# Patient Record
Sex: FEMALE | Race: WHITE | NOT HISPANIC OR LATINO | Employment: UNEMPLOYED | URBAN - METROPOLITAN AREA
[De-identification: names, ages, dates, MRNs, and addresses within clinical notes are randomized per-mention and may not be internally consistent; named-entity substitution may affect disease eponyms.]

---

## 2017-02-10 ENCOUNTER — CLINICAL SUPPORT (OUTPATIENT)
Dept: REHABILITATION | Facility: HOSPITAL | Age: 57
End: 2017-02-10
Attending: INTERNAL MEDICINE
Payer: COMMERCIAL

## 2017-02-10 DIAGNOSIS — K59.04 FUNCTIONAL CONSTIPATION: ICD-10-CM

## 2017-02-10 PROCEDURE — 97161 PT EVAL LOW COMPLEX 20 MIN: CPT | Mod: PO

## 2017-02-10 PROCEDURE — 97110 THERAPEUTIC EXERCISES: CPT | Mod: PO

## 2017-02-10 NOTE — PROGRESS NOTES
Patient: Chelsie COSTELLO Purnima   [facility] #:  [id number]    Date of treatment: 02/10/2017   Time in: 10:55  Time out: 12:00    Chelsie is a 56 y.o. female evaluated on 2/10/2017    Physician:  Steve Verdin MD   Diagnosis:   Encounter Diagnosis   Name Primary?    Functional constipation         Treatment ordered: PT    Medical History:   Past Medical History   Diagnosis Date    Disc disorder      of cervical and lumbar regions    Fibromyalgia     IBS (irritable bowel syndrome)     Plantar fasciitis      bilat        Surgical History:   Past Surgical History   Procedure Laterality Date    Nose surgery      Tummy tuck      Bone resection, rib       r/t rib fracture    Tonsillectomy          Medications:   Current Outpatient Prescriptions   Medication Sig    amitriptyline (ELAVIL) 25 MG tablet Take 25 mg by mouth nightly as needed.     conj estrogens-bazedoxifene (DUAVEE) 0.45-20 mg Tab Take 1 tablet by mouth once daily.    estradiol (ESTRACE) 0.01 % (0.1 mg/gram) vaginal cream Place 0.5 g vaginally every other day.    GRALISE 300 mg Tb24 Take 900 mg by mouth every evening.     LINZESS 290 mcg Cap Take 290 mcg by mouth once daily.     metformin (GLUCOPHAGE-XR) 500 MG 24 hr tablet     naproxen (NAPROSYN) 500 MG tablet     tizanidine (ZANAFLEX) 4 MG tablet Take 4 mg by mouth 3 (three) times daily as needed.     TRINTELLIX 10 mg Tab      No current facility-administered medications for this visit.        Allergies: Review of patient's allergies indicates:  No Known Allergies   Precautions: universal  OB:GYN History:childbirth vaginal delivery x2 and episiotomy      Bladder/Bowel History: trouble initiating urine stream, dribbling after urination, urinary incontinence and constipation/straining for movement       SUBJECTIVE:   History of current complaint: pt reports that she reports that she takes Linzess and 2 laxatives daily.  (senna based).  With this regimen, she has a daily movement in the am.  Stool  consistency is soft, almost watery.  Vacations throw her off.  She is able to pass stool fairly well- she reports that she waits until the urge to defecate is pretty intense.  She has had this regimen for the past couple of years.      Date of Onset: at least 2 years ago  Symptoms are: unchanged    Frequency of Urination: Daytime: every 2-5 hours           Nighttime: 0-1    Urine Stream: strong    Frequency of Bowel Movements: once a day    Types of Fluid Intake: water, diet Dr Pepper (4 per day), coffee (2 mugs)      Bladder Leakage: Yes  Frequency of incidents: few times per week  Amount Leaked: teaspoons    Colon Leakage: No  Frequency of incidents: (only happened couple of times with excessive laxative use)  Amount Leaked: streaking/staining      Current Exercise: yoga     Pain:  Patient reports 0/10 with 0 being the lowest and 10 being the highest        OBJECTIVE:  Patient received 50 minutes of treatment which consisted of evaluation and 10 minutes of ther ex.    ORTHO SCREEN  Posture: WNL      Pelvic Alignment: no deviations noted in supine       ABDOMINALS  Scarring:  Tummy tuck scar mobile and non-painful      Diastasis: none   Abdominal strength: rectus 4/5     Transverse : required multiple cues to activate in an isolated fashion.  Could also distend her belly fairly well despite tummy tuck       RECTAL PELVIC FLOOR EXAM:    Excursion:WNL   Discharge: none    Skin Integrity:  Intact perianal skin          External Clock:   Scarring:   none  Sensation: WNL    Pain: none  Anal Bodega: intact     Internal Assessment:   Impaction:none    Pain: none  EAS tone: WNL  Sensation: WNL    Muscle Power: 2 /5         Quality: decreased hold and incomplete relaxation       Specificity:patient contracts: gluts but could correct with cues      Coordination:tends to hold breath during PFM contraction    SEMG Evaluation: deferred due to time constraints    Treatment Given: instructed on general anatomy/physiology of  urinary/bowel system; discussed plan of care with patient; instructed in benefits/risks of treatment; instructed in alternative methods of treatment; instructed in risks of refusing treatment; patient agreed to treatment plan; instructed in diaphragmatic breathing per handout issued.  Pt verbalized understanding of all instruction.      ASSESSMENT:  History  Co-morbidities and personal factors that may impact the plan of care Examination  Body Structures and Functions, activity limitations and participation restrictions that may impact the plan of care Clinical Presentation   Decision Making/ Complexity Score   Co-morbidities:   History of tummy tuck (abdominoplasty), IBS    Personal Factors:   Increased consumption of bladder irritants Body Regions:Weak, poorly relaxing pelvic floor muscles, weak TA    Body Systems:           Activity limitations:       Participation Restrictions:   Pt's ability to travel is limited by        Stable and uncomplicated 25% limitation per [facility] Bowel Survey in [facility]           Problem List:    poor knowledge of body mechanics and posture, poor trunk stability, decreased pelvic muscle strength, decreased phasic ability of the pelvid cmuscles, increased tension of the pelvic muscles, poor quality of pelvic muscle contraction, poor coordination of pelvic floor muscles during ADL's leading to urinary or fecal leakage and dysfunctional defecation    Barriers to Learning: none    Educational/Spiritual/Cultural needs:  None  Environmental Barriers: none noted  Abuse/Neglect: no signs  Nutritional Status: WDWN WF  Fall Risk: none    FUNCTIONAL GOALS: 3 months  1. Patient able to perform basic ADL with less leaking.,   2. Cough, sneeze, or laugh with less incontinence.,   3. Able to maintain normal breathing pattern during pelvic floor muscle contraction.,   4. Pt. to be I with techniques for double voiding.,   5. Pt. to report more effective stooling.,   6. Pt to be I with self mgmt. of  symptoms.    7. Pt to be I with HEP.    PATIENTS GOALS: to be able to defecate more easily without so much medication, and to avoid urinary leakage.      PLAN:  theraputic exercises, neuromuscular re-ed and manual therapy as indicated    Physical Therapy Education: anatomy/physiology of pelvic floor, posture/body mechanices, dilator use, diaphragmatic breathing and double voiding techniques    Frequency/Duration: once per 2 weeks for 3 months

## 2017-02-10 NOTE — PATIENT INSTRUCTIONS
Home Exercise Program: 02/10/2017      DIAPHRAGMATIC BREATHING     The diaphragm is a dome shaped muscle that forms the floor of the rib cage. It is the most efficient muscle for breathing and relaxation, although most people are not used to using the diaphragm. Diaphragmatic or belly breathing is an important technique to learn because it helps settle down or relax the autonomic nervous system. The correct use of diaphragmatic breathing can help to quiet brain activity resulting in the relaxation of all the muscles and organs of the body. This is accomplished by slow rhythmic breathing concentrated in the diaphragm muscle rather than the chest.    How to do proper relaxation breathing:     Start by lying on your back or reclining in a chair in a relaxed position. Place one hand on your chest and the other on your abdomen.   Relax your jaw by placing your tongue on the roof of your mouth and keeping your teeth slightly apart.    Take a deep breath in through your nose, letting the abdomen expand and rise while you keep your upper chest, neck and shoulders relaxed.    As you breathe out through your mouth, allow your abdomen and chest to fall. Exhale completely.   Remember to breathe slowly.  Do not force your breathing. Do not hold your breath.   Repeat for 5 minutes, 1-2 times per day   Also: sit tall on toilet with knees apart to avoid vaginal voiding.

## 2017-03-03 ENCOUNTER — CLINICAL SUPPORT (OUTPATIENT)
Dept: REHABILITATION | Facility: HOSPITAL | Age: 57
End: 2017-03-03
Attending: INTERNAL MEDICINE
Payer: COMMERCIAL

## 2017-03-03 DIAGNOSIS — K59.04 FUNCTIONAL CONSTIPATION: ICD-10-CM

## 2017-03-03 PROCEDURE — 97112 NEUROMUSCULAR REEDUCATION: CPT | Mod: PO

## 2017-03-03 NOTE — PATIENT INSTRUCTIONS
"Home Exercise Program: 03/03/2017      DIAPHRAGMATIC BREATHING     The diaphragm is a dome shaped muscle that forms the floor of the rib cage. It is the most efficient muscle for breathing and relaxation, although most people are not used to using the diaphragm. Diaphragmatic or belly breathing is an important technique to learn because it helps settle down or relax the autonomic nervous system. The correct use of diaphragmatic breathing can help to quiet brain activity resulting in the relaxation of all the muscles and organs of the body. This is accomplished by slow rhythmic breathing concentrated in the diaphragm muscle rather than the chest.    How to do proper relaxation breathing:     Start by lying on your back or reclining in a chair in a relaxed position. Place one hand on your chest and the other on your abdomen.   Take a deep breath in through your nose, letting the abdomen expand and rise while you keep your upper chest, neck and shoulders relaxed. DO NOT PUSH YOUR BELLY OUT.  "LET THE AIR DO THE WORK."   As you breathe out through your mouth, allow your abdomen and chest to fall. Exhale completely.  DO NOT PULL YOUR BELLY IN.   Remember to breathe slowly.  Do not force your breathing. Do not hold your breath.   Repeat for 5 minutes, 1-2 times per day   Also: sit tall on toilet with knees apart to avoid vaginal voiding.            Remember: when pushing to poop, your belly should move out and waist should widen.    "

## 2017-03-03 NOTE — PROGRESS NOTES
"Patient: Chelsie Felton   Clinic #: [id number]   Diagnosis:   Encounter Diagnosis   Name Primary?    Functional constipation       Date of Start of care: 2/10/2017  Date of treatment: 03/03/2017   Time in: 12:30  Time out: 1:15  Total Treatment time: 40 minutes    Subjective: pt reports last BM was this am.  She reports that she took Linzess this morning upon waking and she had 2-3 BM's in succession.  Watery stools.  She is asking about how to decrease her medications to bulk her stool.  I directed these to her physician.      Chelsie reported 0/10 pain today.    Objective:      Treatment:     Chelsie Felton  received neuromuscular re-education for improved coordination x 40 minutes: Kegels with assist of SEMG; bearing down with pelvic floor drop with SEMG assist.   We worked in supine and sidelying with external lead wires.  Pt demonstrated elevated baseline resting, fair/good WR rise, and fair holding in 10 sec Kegels.  Derecruitment was incomplete and delayed.  (She dropped fully only once).  In bearing down, she demonstrated slightly increased PFM activity.  We then reviewed proper technique for bearing down, and diaphragmatic breathing.  She was noted in DB to push her belly out forcefully, and to draw it in actively.  With cues to "let the air to the work", she was able to correct this.  She was issued a handout with new instructions, including having her widen her waist when trying to defecate.  Pt verbalized understanding of all instruction.      Assessment: Pt performed well with the right verbal cues- hopefully this will carry over between sessions.       Will the patient continue to benefit from skilled PT intervention? Yes  Medical Necessity is demonstrated by:  Requires skilled supervision to complete and progress HEP  Progress towards goals: fair    New/Revised Goals:  none    Plan:  Continue with established Plan of Care toward PT goals.          "

## 2017-03-29 ENCOUNTER — CLINICAL SUPPORT (OUTPATIENT)
Dept: REHABILITATION | Facility: HOSPITAL | Age: 57
End: 2017-03-29
Attending: INTERNAL MEDICINE
Payer: COMMERCIAL

## 2017-03-29 DIAGNOSIS — K59.04 FUNCTIONAL CONSTIPATION: ICD-10-CM

## 2017-03-29 PROCEDURE — 97140 MANUAL THERAPY 1/> REGIONS: CPT | Mod: PO

## 2017-03-29 PROCEDURE — 97110 THERAPEUTIC EXERCISES: CPT | Mod: PO

## 2017-03-29 NOTE — PATIENT INSTRUCTIONS
Home Program: 3/29/2017    1. Try to get 3 square meals per day.  Don't eat so much that you are uncomfortable, but don't graze either.      2. After each meal, lie down or sit on toilet and do colon massage, at least 3 passes.  This should happen at least 5-10 minutes after finishing your meal.    3. Then sit on toilet and do diaphragmatic breathing.  Should aim for a small need to push- not a big strain or NO push.      4. Don't sit and breathe for more than 5 minutes.

## 2017-03-29 NOTE — PROGRESS NOTES
"Patient: Chelsie Felton   Clinic #: [id number]   Diagnosis:   Encounter Diagnosis   Name Primary?    Functional constipation       Date of Start of care: 2/10/2017  Date of treatment: 03/29/2017   Time in: 11:07  Time out: 12:05  Total Treatment time:  55 minutes    Subjective: pt reports last BM was this am.  She has tried to eliminate the laxatives ([facility] laxative and stool softener) and just take Linzess in the am but she starts backing up if she doesn't do both.  Watery stools.  She reports abdominal discomfort and bloating today, despite having a BM this morning.  She has been compliant with the HEP issued last session.        Chelsie reported 0/10 pain today.    Objective:      Treatment:     Chelsie Felton  received neuromuscular re-education for improved coordination x 25 minutes: Kegels with assist of SEMG; DB for pelvic floor muscle derecruitment.   We worked in supine with external lead wires.  Pt demonstrated elevated baseline resting which normalized after a few minutes, fair WR rise, and fair holding in 10 sec Kegels.  Derecruitment was delayed but became more and more effective with reps.  (She dropped fully 4 times out of 10).  She was noted to use DB much more effectively this session.  We spoke about her toileting schedule, and including sitting for 5 minutes with DB.    She was issued a handout with new instructions.    Pt verbalized understanding of all instruction.      Pt then rec'd 30 minutes of manual therapy including instruction in colon massage, and cecal mobilization.  We discussed her habit of "grazing" instead of eating 3 full meals, and I suggested that she eat 3 meals with minimal snacking in between to facilitate bowel regularity.  Please refer to pt. Instructions for revised home program.     Assessment: Pt with improved PFM release- will try to look at bearing down in sitting position, possibly on toilet next session.      Will the patient continue to benefit from skilled PT " intervention? Yes  Medical Necessity is demonstrated by:  Requires skilled supervision to complete and progress HEP  Progress towards goals: fair    New/Revised Goals:  none    Plan:  Continue with established Plan of Care toward PT goals.

## 2017-04-26 ENCOUNTER — CLINICAL SUPPORT (OUTPATIENT)
Dept: REHABILITATION | Facility: HOSPITAL | Age: 57
End: 2017-04-26
Attending: INTERNAL MEDICINE
Payer: COMMERCIAL

## 2017-04-26 DIAGNOSIS — K59.04 FUNCTIONAL CONSTIPATION: ICD-10-CM

## 2017-04-26 PROCEDURE — 97112 NEUROMUSCULAR REEDUCATION: CPT | Mod: PO

## 2017-04-26 NOTE — PATIENT INSTRUCTIONS
Home Program: 4/26/2017    Continue sitting for 5 minutes after meals, doing colon massage.      When ready to push, remember the sequence:  1. Breathe in letting your belly go.    2. Breathe out slowly  3. After starting your exhale, bear down and push.  Push until you have finished exhaling.

## 2017-04-26 NOTE — PROGRESS NOTES
Patient: Chelsie Felton   Clinic #: [id number]   Diagnosis:   Encounter Diagnosis   Name Primary?    Functional constipation       Date of Start of care: 2/10/2017  Date of treatment: 04/26/2017   Time in: 3:02  Time out: 4:00  Total Treatment time:  55 minutes    Subjective: pt reports last BM was this am.  Has tried Linzess at night but doesn't work so is back to taking 2 laxatives at night and Linzess during the day.    She reports no change in her abdominal discomfort.   She has been compliant with the HEP issued last session.        Chelsie reported 0/10 pain today.    Objective:      Treatment:     Chelsie Felton  received neuromuscular re-education for improved coordination x 55 minutes: Kegels with assist of SEMG; DB for pelvic floor muscle derecruitment, with practice bearing down in sitting on at and on toilet.   We worked in sitting with external lead wires.  Pt demonstrated elevated baseline resting which normalized after a few minutes, good WR rise, and fair holding in 10 sec Kegels.  Derecruitment was delayed but became more and more effective with reps.   We then moved to the restroom and worked on pelvic floor drop in bearing down with SEMG assist.  She was noted to drop the pelvic floor well if she pushed after starting her exhale first.      She was issued a handout with new instructions as to how to practice bearing down at home.    Pt verbalized understanding of all instruction.      Assessment: Pt performed well- actually passed a little gas, which I told her was a step in the right direction.       Will the patient continue to benefit from skilled PT intervention? Yes  Medical Necessity is demonstrated by:  Requires skilled supervision to complete and progress HEP  Progress towards goals: fair    New/Revised Goals:  none    Plan:  Continue with established Plan of Care toward PT goals.

## 2017-07-05 ENCOUNTER — CLINICAL SUPPORT (OUTPATIENT)
Dept: REHABILITATION | Facility: HOSPITAL | Age: 57
End: 2017-07-05
Attending: INTERNAL MEDICINE
Payer: COMMERCIAL

## 2017-07-05 DIAGNOSIS — K59.04 FUNCTIONAL CONSTIPATION: ICD-10-CM

## 2017-07-05 PROCEDURE — 97112 NEUROMUSCULAR REEDUCATION: CPT | Mod: PO

## 2017-07-05 NOTE — PROGRESS NOTES
"Patient: Chelsie Felton   Clinic #: [id number]   Diagnosis:   Encounter Diagnosis   Name Primary?    Functional constipation       Date of Start of care: 2/10/2017  Date of treatment: 07/05/2017   Time in: 3:05  Time out: 3:45  Total Treatment time: 40 minutes    Subjective: pt reports last BM was this am.  She has been able to reduce her laxative intake "a good bit" but is still taking daily Linzess.  Finds the colon massage helpful for her abdominal discomfort.      Chelsie reported 0/10 pain today.    Objective:      Treatment:     Chelsie Felton  received neuromuscular re-education for improved coordination x 40 minutes: Kegels with assist of SEMG; DB for pelvic floor muscle derecruitment, with practice bearing down in sitting on at and on toilet.   We worked in SL and sitting with external lead wires.  Pt demonstrated elevated baseline resting which normalized after a few minutes, good WR rise, and fair holding in 10 sec Kegels.   We then moved to the restroom and worked on pelvic floor drop in bearing down with SEMG assist.  She was noted to drop the pelvic floor well if she pushed after starting her exhale first.      We reviewed proper posture and pelvic positioning for more effective evacuation.  She was instructed to continue to utilize the push and blow technique for pelvic floor drop, and to follow up with her doctor as needed.      Pt verbalized understanding of all instruction.      Assessment: pt performed well and is less dependent on laxative medication for constipation.  I with HEP.  No further need for PT services noted.       Goals:  1. Patient able to perform basic ADL with less leaking.,   2. Cough, sneeze, or laugh with less incontinence.,   3. Able to maintain normal breathing pattern during pelvic floor muscle contraction.,   4. Pt. to be I with techniques for double voiding.,   5. Pt. to report more effective stooling.,   6. Pt to be I with self mgmt. of symptoms.    7. Pt to be I with " HEP  ALL MET    Plan:  D/C PT

## 2018-06-07 PROBLEM — M25.50 MULTIPLE JOINT PAIN: Status: ACTIVE | Noted: 2018-06-07

## 2018-06-07 PROBLEM — M79.10 MYALGIA: Status: ACTIVE | Noted: 2018-06-07

## 2018-06-07 PROBLEM — M25.552 BILATERAL HIP PAIN: Status: ACTIVE | Noted: 2018-06-07

## 2018-06-07 PROBLEM — M62.838 MUSCLE SPASM: Status: ACTIVE | Noted: 2018-06-07

## 2018-06-07 PROBLEM — M79.10 MUSCLE PAIN: Status: ACTIVE | Noted: 2018-06-07

## 2018-06-07 PROBLEM — R53.82 CHRONIC FATIGUE: Status: ACTIVE | Noted: 2018-06-07

## 2018-06-07 PROBLEM — M25.551 BILATERAL HIP PAIN: Status: ACTIVE | Noted: 2018-06-07

## 2018-06-07 PROBLEM — M54.2 CERVICAL PAIN: Status: ACTIVE | Noted: 2018-06-07

## 2018-07-19 PROBLEM — R74.8 ELEVATED CPK: Status: ACTIVE | Noted: 2018-07-19

## 2018-09-20 PROBLEM — M54.2 CERVICALGIA: Status: ACTIVE | Noted: 2018-09-20

## 2018-09-20 PROBLEM — M60.9 MYOSITIS OF MULTIPLE SITES: Status: ACTIVE | Noted: 2018-09-20

## 2018-10-05 PROBLEM — J20.9 ACUTE BRONCHITIS: Status: ACTIVE | Noted: 2018-10-05

## 2018-10-05 PROBLEM — R05.9 COUGH: Status: ACTIVE | Noted: 2018-10-05

## 2022-03-02 LAB — PAP RECOMMENDATION EXT: NORMAL

## 2022-03-24 LAB — CRC RECOMMENDATION EXT: NORMAL

## 2022-04-10 ENCOUNTER — HOSPITAL ENCOUNTER (EMERGENCY)
Facility: HOSPITAL | Age: 62
Discharge: HOME | End: 2022-04-10
Attending: EMERGENCY MEDICINE
Payer: COMMERCIAL

## 2022-04-10 VITALS
BODY MASS INDEX: 25.61 KG/M2 | TEMPERATURE: 98 F | WEIGHT: 150 LBS | OXYGEN SATURATION: 99 % | SYSTOLIC BLOOD PRESSURE: 161 MMHG | DIASTOLIC BLOOD PRESSURE: 100 MMHG | HEART RATE: 84 BPM | HEIGHT: 64 IN | RESPIRATION RATE: 18 BRPM

## 2022-04-10 DIAGNOSIS — S05.12XA CONTUSION OF LEFT ORBIT, INITIAL ENCOUNTER: ICD-10-CM

## 2022-04-10 DIAGNOSIS — H11.32 SUBCONJUNCTIVAL HEMORRHAGE, LEFT: Primary | ICD-10-CM

## 2022-04-10 PROCEDURE — 25000003 PHARM REV CODE 250: Performed by: PHYSICIAN ASSISTANT

## 2022-04-10 PROCEDURE — 99283 EMERGENCY DEPT VISIT LOW MDM: CPT

## 2022-04-10 RX ORDER — TETRACAINE HYDROCHLORIDE 5 MG/ML
2 SOLUTION OPHTHALMIC
Status: COMPLETED | OUTPATIENT
Start: 2022-04-10 | End: 2022-04-10

## 2022-04-10 RX ADMIN — FLUORESCEIN SODIUM 1 EACH: 1 STRIP OPHTHALMIC at 02:04

## 2022-04-10 RX ADMIN — TETRACAINE HYDROCHLORIDE 2 DROP: 5 SOLUTION OPHTHALMIC at 02:04

## 2022-04-10 NOTE — ED PROVIDER NOTES
Encounter Date: 4/10/2022    SCRIBE #1 NOTE: I, Jose J Hsieh, am scribing for, and in the presence of,  Stefan Lin PA-C. I have scribed the following portions of the note - Other sections scribed: HPI, ROS.       History     Chief Complaint   Patient presents with    Eye Pain     PT to ER with c/o left eye pain s/p something falling on eye while in store. Pt denies blurred vision.      61 y.o. female, with no pertinent past medical history presents to the ED with constant mild left eye pain that began 1 hour prior to arrival. Pt states that she was shopping at [employer] when an approximately 20 pound garden pole fell onto her face and hit her left eye. Pt reports associated eye redness and left sided facial pain. Pt states that most of the facial pain is over her left eye brow. Pt denies being on blood thinners. No other exacerbating or alleviating factors. Patient denies loss of consciousness, nausea, vomiting, photophobia, or other associated symptoms.       The history is provided by the patient. No  was used.     Review of patient's allergies indicates:  No Known Allergies  Past Medical History:   Diagnosis Date    Disc disorder     of cervical and lumbar regions    Fibromyalgia     IBS (irritable bowel syndrome)     Plantar fasciitis     bilat     Past Surgical History:   Procedure Laterality Date    BONE RESECTION, RIB      r/t rib fracture    NOSE SURGERY      TONSILLECTOMY      tummy tuck       Family History   Problem Relation Age of Onset    Breast cancer Neg Hx     Colon cancer Neg Hx     Ovarian cancer Neg Hx      Social History     Tobacco Use    Smoking status: Never Smoker    Smokeless tobacco: Never Used   Substance Use Topics    Alcohol use: No     Review of Systems   Constitutional: Negative for chills and fever.   HENT: Negative for congestion, rhinorrhea and sore throat.         (+) left sided facial pain   Eyes: Positive for pain and redness. Negative  for photophobia and visual disturbance.   Respiratory: Negative for cough and shortness of breath.    Cardiovascular: Negative for chest pain.   Gastrointestinal: Negative for abdominal pain, diarrhea, nausea and vomiting.   Genitourinary: Negative for dysuria, frequency and hematuria.   Musculoskeletal: Negative for back pain.   Skin: Negative for rash.   Neurological: Negative for dizziness, syncope, weakness and headaches.       Physical Exam     Initial Vitals [04/10/22 1300]   BP Pulse Resp Temp SpO2   (!) 161/100 84 18 98 °F (36.7 °C) 99 %      MAP       --         Physical Exam    Nursing note and vitals reviewed.  Constitutional: She appears well-developed and well-nourished. She is not diaphoretic. No distress.   HENT:   Head: Normocephalic and atraumatic.   Nose: Nose normal.   Eyes:   Subconjunctival hemorrhage without hyphema to the left eye.  No photophobia.  No fluorescein uptake.  Minor tenderness without bony deformity or bruising to the left mid eyebrow.  Full ROM of extraocular muscles without pain or difficulty.  No hemotympanum or septal hematoma.  No infra orbit tenderness.  Intra-ocular pressure equals 16.    Neck: No tracheal deviation present. No JVD present.   Normal range of motion.  Cardiovascular: Normal rate, regular rhythm and normal heart sounds. Exam reveals no friction rub.    No murmur heard.  Pulmonary/Chest: Breath sounds normal. No stridor. No respiratory distress. She has no wheezes. She has no rhonchi. She has no rales. She exhibits no tenderness.   Musculoskeletal:         General: No tenderness or edema. Normal range of motion.      Cervical back: Normal range of motion.     Neurological: She is alert and oriented to person, place, and time. She has normal strength. She displays no tremor. She displays no seizure activity. Coordination and gait normal.   Skin: Skin is warm and dry. No rash and no abscess noted. No erythema. No pallor.         ED Course   Procedures  Labs  Reviewed - No data to display       Imaging Results    None          Medications   fluorescein ophthalmic strip 1 each (1 each Left Eye Given 4/10/22 1401)   TETRAcaine HCl (PF) 0.5 % Drop 2 drop (2 drops Left Eye Given 4/10/22 1400)     Medical Decision Making:   History:   Old Medical Records: I decided to obtain old medical records.  ED Management:  Subconjunctival hemorrhage on the left and contusion of the left supra-orbit.  No evidence of open globe or convincing evidence for orbit fracture.  No signs of entrapment.  Normal intra-ocular pressure.  No vision loss.  Low suspicion for intracranial hemorrhage.  Patient reassured.  Advising follow-up with PCP. Strict return precautions discussed.  Agreeable to plan.          Scribe Attestation:   Scribe #1: I performed the above scribed service and the documentation accurately describes the services I performed. I attest to the accuracy of the note.                 Clinical Impression:   Final diagnoses:  [H11.32] Subconjunctival hemorrhage, left (Primary)  [S05.12XA] Contusion of left orbit, initial encounter          ED Disposition Condition    Discharge Stable        ED Prescriptions     None        Follow-up Information     Follow up With Specialties Details Why Contact Info    Hugo Hale MD Internal Medicine Schedule an appointment as soon as possible for a visit in 1 day For re-evaluation [address] [facility] [id number] [phone]      [facility] - Emergency Dept Emergency Medicine Go to  If symptoms worsen [address]  [facility] [address] [phone]        I, Stefan Lin PA-C, personally performed the services described in this documentation. All medical record entries made by the scribe were at my direction and in my presence. I have reviewed the chart and agree that the record reflects my personal performance and is accurate and complete.       Stefan Lin PA-C  04/10/22 2316

## 2022-04-10 NOTE — DISCHARGE INSTRUCTIONS

## 2022-05-25 ENCOUNTER — OFFICE VISIT (OUTPATIENT)
Dept: OBSTETRICS AND GYNECOLOGY | Facility: CLINIC | Age: 62
End: 2022-05-25
Payer: COMMERCIAL

## 2022-05-25 VITALS
SYSTOLIC BLOOD PRESSURE: 112 MMHG | DIASTOLIC BLOOD PRESSURE: 82 MMHG | WEIGHT: 162.25 LBS | BODY MASS INDEX: 27.85 KG/M2

## 2022-05-25 DIAGNOSIS — N63.11 BREAST LUMP ON RIGHT SIDE AT 10 O'CLOCK POSITION: Primary | ICD-10-CM

## 2022-05-25 DIAGNOSIS — Z12.31 BREAST CANCER SCREENING BY MAMMOGRAM: ICD-10-CM

## 2022-05-25 PROCEDURE — 99999 PR PBB SHADOW E&M-EST. PATIENT-LVL III: CPT | Mod: PBBFAC,,, | Performed by: OBSTETRICS & GYNECOLOGY

## 2022-05-25 PROCEDURE — 3079F DIAST BP 80-89 MM HG: CPT | Mod: CPTII,S$GLB,, | Performed by: OBSTETRICS & GYNECOLOGY

## 2022-05-25 PROCEDURE — 1159F PR MEDICATION LIST DOCUMENTED IN MEDICAL RECORD: ICD-10-PCS | Mod: CPTII,S$GLB,, | Performed by: OBSTETRICS & GYNECOLOGY

## 2022-05-25 PROCEDURE — 3008F BODY MASS INDEX DOCD: CPT | Mod: CPTII,S$GLB,, | Performed by: OBSTETRICS & GYNECOLOGY

## 2022-05-25 PROCEDURE — 99203 OFFICE O/P NEW LOW 30 MIN: CPT | Mod: S$GLB,,, | Performed by: OBSTETRICS & GYNECOLOGY

## 2022-05-25 PROCEDURE — 3074F SYST BP LT 130 MM HG: CPT | Mod: CPTII,S$GLB,, | Performed by: OBSTETRICS & GYNECOLOGY

## 2022-05-25 PROCEDURE — 3008F PR BODY MASS INDEX (BMI) DOCUMENTED: ICD-10-PCS | Mod: CPTII,S$GLB,, | Performed by: OBSTETRICS & GYNECOLOGY

## 2022-05-25 PROCEDURE — 3074F PR MOST RECENT SYSTOLIC BLOOD PRESSURE < 130 MM HG: ICD-10-PCS | Mod: CPTII,S$GLB,, | Performed by: OBSTETRICS & GYNECOLOGY

## 2022-05-25 PROCEDURE — 99999 PR PBB SHADOW E&M-EST. PATIENT-LVL III: ICD-10-PCS | Mod: PBBFAC,,, | Performed by: OBSTETRICS & GYNECOLOGY

## 2022-05-25 PROCEDURE — 1159F MED LIST DOCD IN RCRD: CPT | Mod: CPTII,S$GLB,, | Performed by: OBSTETRICS & GYNECOLOGY

## 2022-05-25 PROCEDURE — 3079F PR MOST RECENT DIASTOLIC BLOOD PRESSURE 80-89 MM HG: ICD-10-PCS | Mod: CPTII,S$GLB,, | Performed by: OBSTETRICS & GYNECOLOGY

## 2022-05-25 PROCEDURE — 99203 PR OFFICE/OUTPT VISIT, NEW, LEVL III, 30-44 MIN: ICD-10-PCS | Mod: S$GLB,,, | Performed by: OBSTETRICS & GYNECOLOGY

## 2022-05-25 RX ORDER — DULOXETIN HYDROCHLORIDE 30 MG/1
30 CAPSULE, DELAYED RELEASE ORAL 2 TIMES DAILY
COMMUNITY
Start: 2021-12-09 | End: 2022-05-31 | Stop reason: SDUPTHER

## 2022-05-25 RX ORDER — MELOXICAM 15 MG/1
15 TABLET ORAL DAILY PRN
COMMUNITY
Start: 2022-04-04 | End: 2022-05-31 | Stop reason: SDUPTHER

## 2022-05-25 NOTE — PROGRESS NOTES
SUBJECTIVE:   61 y.o. female   for breast lump    Patient's last menstrual period was 2012..      Pt has had screening MMG in 3/2022 and NL . This was done at [facility]  She reported feeling a small lump on her Rt breast x 2 days  Not painful  Only feeling it depends on the position  Denies skin changes or nipple discharge  Pt with h/o breast biopsy years ago - does not remember where  Denies personal h/o or FH of breast cancer    She is menopause and has been on HRT x years, she is working to wean off of HRT      OB History    Para Term  AB Living   2 2           SAB IAB Ectopic Multiple Live Births                  # Outcome Date GA Lbr Adrian/2nd Weight Sex Delivery Anes PTL Lv   2 Para            1 Para               Obstetric Comments   Denies abnl pap, pap neg 3/2022     Past Medical History:   Diagnosis Date    Disc disorder     of cervical and lumbar regions    Fibromyalgia     IBS (irritable bowel syndrome)     Plantar fasciitis     bilat     Past Surgical History:   Procedure Laterality Date    BONE RESECTION, RIB      r/t rib fracture    NOSE SURGERY      TONSILLECTOMY      tummy tuck       Social History     Socioeconomic History    Marital status:    Tobacco Use    Smoking status: Never Smoker    Smokeless tobacco: Never Used   Substance and Sexual Activity    Alcohol use: No     Family History   Problem Relation Age of Onset    Breast cancer Neg Hx     Colon cancer Neg Hx     Ovarian cancer Neg Hx          Current Outpatient Medications   Medication Sig Dispense Refill    DULoxetine (CYMBALTA) 30 MG capsule Take 30 mg by mouth 2 (two) times daily.      conj estrogens-bazedoxifene (DUAVEE) 0.45-20 mg Tab Take 1 tablet by mouth once daily. (Patient not taking: Reported on 2022) 30 tablet 12    GRALISE 300 mg Tb24 Take 900 mg by mouth every evening.       LINZESS 290 mcg Cap Take 290 mcg by mouth once daily.       meloxicam (MOBIC) 15 MG tablet Take 15 mg  by mouth daily as needed.      tizanidine (ZANAFLEX) 4 MG tablet Take 4 mg by mouth 3 (three) times daily as needed.       TRINTELLIX 10 mg Tab        No current facility-administered medications for this visit.     Allergies: Patient has no known allergies.       ROS:  GENERAL: Denies weight gain or weight loss. Feeling well overall.   SKIN: Denies rash or lesions.   HEAD: Denies head injury or headache.   NODES: Denies enlarged lymph nodes.   CHEST: Denies chest pain or shortness of breath.   CARDIOVASCULAR: Denies palpitations or left sided chest pain.   ABDOMEN: No abdominal pain, constipation, diarrhea, nausea, vomiting or rectal bleeding.   URINARY: No frequency, dysuria, hematuria, or burning on urination.  REPRODUCTIVE: Denies vaginal discharge, abnormal vaginal bleeding, lesions, pelvic pain  BREASTS: The patient performs breast self-examination and denies pain, lumps, or nipple discharge.   HEMATOLOGIC: No easy bruisability or excessive bleeding.  MUSCULOSKELETAL: Denies joint pain or swelling.   NEUROLOGIC: Denies syncope or weakness.   PSYCHIATRIC: Denies depression, anxiety or mood swings.      OBJECTIVE:   /82   Wt 73.6 kg (162 lb 4.1 oz)   LMP 01/12/2012   BMI 27.85 kg/m²   The patient appears well, alert, oriented x 3, in no distress.  NECK: negative, no thyromegaly, trachea midline  SKIN: normal, good color, good turgor and no acne, striae, hirsutism  BREAST EXAM: left breast normal without mass, skin or nipple changes or axillary nodes, small tiny lump noted on 10 oclock of right breast. Not tender  ABDOMEN: soft, non-tender; bowel sounds normal; no masses,  no organomegaly and no hernias, masses, or hepatosplenomegaly  GYN: deferred      ASSESSMENT:   .Chelsie was seen today for lump.    Diagnoses and all orders for this visit:    Breast lump on right side at 10 o'clock position  -     US Breast Right Limited; Future        -     Continue self breast exam    Continue weaning of off HRT -  discussed non-HRT if symptoms worsen           normal...

## 2022-05-30 ENCOUNTER — OFFICE VISIT (OUTPATIENT)
Dept: FAMILY MEDICINE | Facility: CLINIC | Age: 62
End: 2022-05-30
Payer: COMMERCIAL

## 2022-05-30 VITALS
SYSTOLIC BLOOD PRESSURE: 122 MMHG | TEMPERATURE: 98 F | DIASTOLIC BLOOD PRESSURE: 78 MMHG | HEART RATE: 69 BPM | BODY MASS INDEX: 27.75 KG/M2 | HEIGHT: 64 IN | WEIGHT: 162.56 LBS | OXYGEN SATURATION: 96 %

## 2022-05-30 DIAGNOSIS — F51.01 PRIMARY INSOMNIA: ICD-10-CM

## 2022-05-30 DIAGNOSIS — M79.7 FIBROMYALGIA: ICD-10-CM

## 2022-05-30 DIAGNOSIS — Z11.59 ENCOUNTER FOR HEPATITIS C SCREENING TEST FOR LOW RISK PATIENT: ICD-10-CM

## 2022-05-30 DIAGNOSIS — Z23 NEED FOR TD VACCINE: ICD-10-CM

## 2022-05-30 DIAGNOSIS — F41.8 DEPRESSION WITH ANXIETY: ICD-10-CM

## 2022-05-30 DIAGNOSIS — Z23 NEED FOR SHINGLES VACCINE: Primary | ICD-10-CM

## 2022-05-30 DIAGNOSIS — E78.1 HYPERTRIGLYCERIDEMIA, ESSENTIAL: ICD-10-CM

## 2022-05-30 PROCEDURE — 3008F PR BODY MASS INDEX (BMI) DOCUMENTED: ICD-10-PCS | Mod: CPTII,S$GLB,, | Performed by: STUDENT IN AN ORGANIZED HEALTH CARE EDUCATION/TRAINING PROGRAM

## 2022-05-30 PROCEDURE — 3078F PR MOST RECENT DIASTOLIC BLOOD PRESSURE < 80 MM HG: ICD-10-PCS | Mod: CPTII,S$GLB,, | Performed by: STUDENT IN AN ORGANIZED HEALTH CARE EDUCATION/TRAINING PROGRAM

## 2022-05-30 PROCEDURE — 3074F PR MOST RECENT SYSTOLIC BLOOD PRESSURE < 130 MM HG: ICD-10-PCS | Mod: CPTII,S$GLB,, | Performed by: STUDENT IN AN ORGANIZED HEALTH CARE EDUCATION/TRAINING PROGRAM

## 2022-05-30 PROCEDURE — 99203 OFFICE O/P NEW LOW 30 MIN: CPT | Mod: 25,S$GLB,, | Performed by: STUDENT IN AN ORGANIZED HEALTH CARE EDUCATION/TRAINING PROGRAM

## 2022-05-30 PROCEDURE — 3074F SYST BP LT 130 MM HG: CPT | Mod: CPTII,S$GLB,, | Performed by: STUDENT IN AN ORGANIZED HEALTH CARE EDUCATION/TRAINING PROGRAM

## 2022-05-30 PROCEDURE — 1159F MED LIST DOCD IN RCRD: CPT | Mod: CPTII,S$GLB,, | Performed by: STUDENT IN AN ORGANIZED HEALTH CARE EDUCATION/TRAINING PROGRAM

## 2022-05-30 PROCEDURE — 99999 PR PBB SHADOW E&M-EST. PATIENT-LVL III: CPT | Mod: PBBFAC,,, | Performed by: STUDENT IN AN ORGANIZED HEALTH CARE EDUCATION/TRAINING PROGRAM

## 2022-05-30 PROCEDURE — 1159F PR MEDICATION LIST DOCUMENTED IN MEDICAL RECORD: ICD-10-PCS | Mod: CPTII,S$GLB,, | Performed by: STUDENT IN AN ORGANIZED HEALTH CARE EDUCATION/TRAINING PROGRAM

## 2022-05-30 PROCEDURE — 3008F BODY MASS INDEX DOCD: CPT | Mod: CPTII,S$GLB,, | Performed by: STUDENT IN AN ORGANIZED HEALTH CARE EDUCATION/TRAINING PROGRAM

## 2022-05-30 PROCEDURE — 90471 IMMUNIZATION ADMIN: CPT | Mod: S$GLB,,, | Performed by: STUDENT IN AN ORGANIZED HEALTH CARE EDUCATION/TRAINING PROGRAM

## 2022-05-30 PROCEDURE — 99999 PR PBB SHADOW E&M-EST. PATIENT-LVL III: ICD-10-PCS | Mod: PBBFAC,,, | Performed by: STUDENT IN AN ORGANIZED HEALTH CARE EDUCATION/TRAINING PROGRAM

## 2022-05-30 PROCEDURE — 90714 TD VACC NO PRESV 7 YRS+ IM: CPT | Mod: S$GLB,,, | Performed by: STUDENT IN AN ORGANIZED HEALTH CARE EDUCATION/TRAINING PROGRAM

## 2022-05-30 PROCEDURE — 90714 TD VACCINE GREATER THAN OR EQUAL TO 7YO PRESERVATIVE FREE IM: ICD-10-PCS | Mod: S$GLB,,, | Performed by: STUDENT IN AN ORGANIZED HEALTH CARE EDUCATION/TRAINING PROGRAM

## 2022-05-30 PROCEDURE — 90750 HZV VACC RECOMBINANT IM: CPT | Mod: S$GLB,,, | Performed by: STUDENT IN AN ORGANIZED HEALTH CARE EDUCATION/TRAINING PROGRAM

## 2022-05-30 PROCEDURE — 99203 PR OFFICE/OUTPT VISIT, NEW, LEVL III, 30-44 MIN: ICD-10-PCS | Mod: 25,S$GLB,, | Performed by: STUDENT IN AN ORGANIZED HEALTH CARE EDUCATION/TRAINING PROGRAM

## 2022-05-30 PROCEDURE — 90472 IMMUNIZATION ADMIN EACH ADD: CPT | Mod: S$GLB,,, | Performed by: STUDENT IN AN ORGANIZED HEALTH CARE EDUCATION/TRAINING PROGRAM

## 2022-05-30 PROCEDURE — 90471 TD VACCINE GREATER THAN OR EQUAL TO 7YO PRESERVATIVE FREE IM: ICD-10-PCS | Mod: S$GLB,,, | Performed by: STUDENT IN AN ORGANIZED HEALTH CARE EDUCATION/TRAINING PROGRAM

## 2022-05-30 PROCEDURE — 90472 ZOSTER RECOMBINANT VACCINE: ICD-10-PCS | Mod: S$GLB,,, | Performed by: STUDENT IN AN ORGANIZED HEALTH CARE EDUCATION/TRAINING PROGRAM

## 2022-05-30 PROCEDURE — 3078F DIAST BP <80 MM HG: CPT | Mod: CPTII,S$GLB,, | Performed by: STUDENT IN AN ORGANIZED HEALTH CARE EDUCATION/TRAINING PROGRAM

## 2022-05-30 PROCEDURE — 90750 ZOSTER RECOMBINANT VACCINE: ICD-10-PCS | Mod: S$GLB,,, | Performed by: STUDENT IN AN ORGANIZED HEALTH CARE EDUCATION/TRAINING PROGRAM

## 2022-05-30 RX ORDER — ALPRAZOLAM 0.5 MG/1
0.5 TABLET ORAL 3 TIMES DAILY
COMMUNITY
End: 2022-05-31 | Stop reason: SDUPTHER

## 2022-05-30 RX ORDER — SOD SULF/POT CHLORIDE/MAG SULF 1.479 G
TABLET ORAL
COMMUNITY
Start: 2022-03-15 | End: 2022-05-30 | Stop reason: ALTCHOICE

## 2022-05-30 RX ORDER — METHYLPREDNISOLONE 4 MG/1
TABLET ORAL
COMMUNITY
Start: 2021-10-13 | End: 2022-05-30 | Stop reason: ALTCHOICE

## 2022-05-30 RX ORDER — ESTERIFIED ESTROGEN AND METHYLTESTOSTERONE .625; 1.25 MG/1; MG/1
1 TABLET ORAL
COMMUNITY
Start: 2022-03-02 | End: 2022-05-30

## 2022-05-30 RX ORDER — MEDROXYPROGESTERONE ACETATE 2.5 MG/1
2.5 TABLET ORAL DAILY
COMMUNITY
Start: 2022-03-02 | End: 2022-05-30

## 2022-05-30 RX ORDER — DULOXETIN HYDROCHLORIDE 30 MG/1
1 CAPSULE, DELAYED RELEASE ORAL 2 TIMES DAILY
COMMUNITY
Start: 2021-12-09 | End: 2022-05-30 | Stop reason: SDUPTHER

## 2022-05-30 RX ORDER — ESTRADIOL AND NORETHINDRONE ACETATE 1; .5 MG/1; MG/1
1 TABLET ORAL DAILY
COMMUNITY
Start: 2021-12-11 | End: 2022-05-30

## 2022-05-30 RX ORDER — MEDROXYPROGESTERONE ACETATE 2.5 MG/1
2.5 TABLET ORAL
COMMUNITY
Start: 2022-03-02 | End: 2022-05-30

## 2022-05-30 RX ORDER — AZELASTINE 1 MG/ML
1 SPRAY, METERED NASAL
COMMUNITY
End: 2022-06-08 | Stop reason: SDUPTHER

## 2022-05-30 RX ORDER — TRAZODONE HYDROCHLORIDE 50 MG/1
50 TABLET ORAL NIGHTLY
Qty: 30 TABLET | Refills: 2 | Status: SHIPPED | OUTPATIENT
Start: 2022-05-30 | End: 2023-04-24

## 2022-05-30 RX ORDER — MELOXICAM 15 MG/1
1 TABLET ORAL DAILY PRN
COMMUNITY
Start: 2021-11-11 | End: 2022-05-30 | Stop reason: SDUPTHER

## 2022-05-30 RX ORDER — ESTERIFIED ESTROGEN AND METHYLTESTOSTERONE .625; 1.25 MG/1; MG/1
1 TABLET ORAL DAILY
COMMUNITY
Start: 2022-03-02 | End: 2022-05-30

## 2022-05-30 NOTE — PATIENT INSTRUCTIONS
Thank you for enrolling in [facility]. Please follow the instructions below to securely access your online medical record. My allows you to send messages to your doctor, view your test results, renew your prescriptions, schedule appointments, and more.     How Do I Sign Up?  In your Internet browser, go to [email or web address].  In the lower right of the page, click the Activate Now link located under the Have Access Code? Title.  Enter your [facility] Access Code exactly as it appears below. You will not need to use this code after youve completed the sign-up process. If you do not sign up before the expiration date, you must request a new code.  [facility] Access Code: [email or web address]  Expires: 7/9/2022  2:04 PM    Enter Date of Birth (mm/dd/yyyy) as indicated and click the Next button. You will be taken to the next sign-up page.  Create a [facility] ID. This will be your new [facility] login ID and cannot be changed, so think of one that is secure and easy to remember.  Create a [facility] password.  Your password must be at least 8 characters long and contain at least 1 letter and 1 number.  You can change your password at any time.  Enter your Password Reset Question and Answer, then click the Next button.   Enter your e-mail address. You will receive e-mail notification when new information is available in [facility].  Click Sign Up. You can now view your medical record.     Additional Information  If you have questions, you can email [email or web address] or call [phone]  to talk to our [facility] staff. Remember, [facility] is NOT to be used for urgent needs. For medical emergencies, dial 911.

## 2022-05-30 NOTE — PROGRESS NOTES
05/30/2022    Chelsie Felton  [id number]    Chief Complaint   Patient presents with    Establish Care       HPI    This patient I snew to me and presents to establish care. Chronic medical conditions listed below.    Gets cramps in toes and ankles feel stiff  Denies any color changes     AWILDA/depression  xanax PRN  But recently taking at night due to life stressor;  with meta cancer  Cymbalta 30 mg BID    Fibromyalgia  Cymbalta 30 mg BID     Insomnia  Dog wakes her at 2 am usually to go outside   Unable to go back to sleep  Sometimes will try benadryl   Goes to sleep without trouble but cant stay asleep    High triglycerides  Seen on lipid panel   Is not on cholesterol medication    Negative 10 point ROS outside of HPI      Past Medical History:   Diagnosis Date    Disc disorder     of cervical and lumbar regions    Fibromyalgia     IBS (irritable bowel syndrome)     Plantar fasciitis     bilat       Past Surgical History:   Procedure Laterality Date    BONE RESECTION, RIB      r/t rib fracture    NOSE SURGERY      TONSILLECTOMY      tummy tuck         Family History   Problem Relation Age of Onset    Breast cancer Neg Hx     Colon cancer Neg Hx     Ovarian cancer Neg Hx        Social History     Socioeconomic History    Marital status:    Tobacco Use    Smoking status: Never Smoker    Smokeless tobacco: Never Used   Substance and Sexual Activity    Alcohol use: No         Current Outpatient Medications:     ALPRAZolam (XANAX) 0.5 MG tablet, Take 0.5 mg by mouth 3 (three) times daily., Disp: , Rfl:     azelastine (ASTELIN) 137 mcg (0.1 %) nasal spray, 1 spray by Nasal route., Disp: , Rfl:     DULoxetine (CYMBALTA) 30 MG capsule, Take 1 capsule by mouth 2 (two) times daily., Disp: , Rfl:     meloxicam (MOBIC) 15 MG tablet, Take 1 tablet by mouth daily as needed., Disp: , Rfl:     conj estrogens-bazedoxifene (DUAVEE) 0.45-20 mg Tab, Take 1 tablet by mouth once daily. (Patient not  taking: No sig reported), Disp: 30 tablet, Rfl: 12    DULoxetine (CYMBALTA) 30 MG capsule, Take 30 mg by mouth 2 (two) times daily., Disp: , Rfl:     estradiol-norethindrone (ACTIVELLA) 1-0.5 mg per tablet, Take 1 tablet by mouth once daily., Disp: , Rfl:     estrogens,esterified,-methyltestosterone 0.625-1.25mg (ESTRATEST HS) per tablet, Take 1 tablet by mouth., Disp: , Rfl:     estrogens,esterified,-methyltestosterone 0.625-1.25mg (ESTRATEST HS) per tablet, Take 1 tablet by mouth once daily., Disp: , Rfl:     GRALISE 300 mg Tb24, Take 900 mg by mouth every evening. , Disp: , Rfl:     LINZESS 290 mcg Cap, Take 290 mcg by mouth once daily. , Disp: , Rfl:     medroxyPROGESTERone (PROVERA) 2.5 MG tablet, Take 2.5 mg by mouth., Disp: , Rfl:     medroxyPROGESTERone (PROVERA) 2.5 MG tablet, Take 2.5 mg by mouth once daily., Disp: , Rfl:     meloxicam (MOBIC) 15 MG tablet, Take 15 mg by mouth daily as needed., Disp: , Rfl:     methylPREDNISolone (MEDROL DOSEPACK) 4 mg tablet, follow package directions, Disp: , Rfl:     SUTAB 1.479-0.188- 0.225 gram tablet, use as directed, Disp: , Rfl:     tizanidine (ZANAFLEX) 4 MG tablet, Take 4 mg by mouth 3 (three) times daily as needed. , Disp: , Rfl:     TRINTELLIX 10 mg Tab, , Disp: , Rfl:         Vitals:    05/30/22 1003   BP: 122/78   Pulse: 69   Temp: 98.1 °F (36.7 °C)       PHYSICAL EXAM    GEN: NAD, AAox3, well nourished  HEENT: NCAT, EOMI, PEERL, no scleral injection, TM normal, moist mucous membranes, oropharynx clear, no erythema, no exudates  NECK: full rom, no cervical lymphadenopathy, no thyroidmegally  CV: RRR, no m/r/g, trace LE edema  LUNGS: CTAB, non-labored breathing, no wheezes, no crackles  ABD: soft, non-distended, no rebound/guarding, no organomegaly  EXT: n c/c/e, warm, 5/5 UE and LE strength  NEURO: CNII-XII intact no focal deficit  PSYCH: nl affect, no hallucinations, nl speech  Skin: intact, no rashes/lesions/erythema    1. Primary insomnia  -  START traZODone (DESYREL) 50 MG tablet; Take 1 tablet (50 mg total) by mouth every evening.  Dispense: 30 tablet; Refill: 2  - Discussed sleep hygiene    2. Need for Td vaccine  - (In Office Administered) Td Vaccine - Preservative Free    3. Need for shingles vaccine  - (In Office Administered) Zoster Recombinant Vaccine    4. Hypertriglyceridemia, essential  - Lipid Panel; Future; in 5 months    5. Encounter for hepatitis C screening test for low risk patient  - Hepatitis C Antibody; Future    6. Fibromyalgia  -continue current regimen    7. Depression with anxiety  Continue current regimen    RTC in 6 months    Chloe Monroy MD  Family Medicine

## 2022-06-01 ENCOUNTER — HOSPITAL ENCOUNTER (OUTPATIENT)
Dept: RADIOLOGY | Facility: HOSPITAL | Age: 62
Discharge: HOME | End: 2022-06-01
Attending: OBSTETRICS & GYNECOLOGY
Payer: COMMERCIAL

## 2022-06-01 VITALS — BODY MASS INDEX: 27.77 KG/M2 | WEIGHT: 162.69 LBS | HEIGHT: 64 IN

## 2022-06-01 DIAGNOSIS — N63.11 BREAST LUMP ON RIGHT SIDE AT 10 O'CLOCK POSITION: ICD-10-CM

## 2022-06-01 PROCEDURE — 76642 ULTRASOUND BREAST LIMITED: CPT | Mod: TC,RT

## 2022-06-01 PROCEDURE — 77061 MAMMO DIGITAL DIAGNOSTIC RIGHT WITH TOMO: ICD-10-PCS | Mod: 26,RT,, | Performed by: RADIOLOGY

## 2022-06-01 PROCEDURE — 77065 DX MAMMO INCL CAD UNI: CPT | Mod: TC,RT

## 2022-06-01 PROCEDURE — 76642 US BREAST RIGHT LIMITED: ICD-10-PCS | Mod: 26,RT,, | Performed by: RADIOLOGY

## 2022-06-01 PROCEDURE — 77065 MAMMO DIGITAL DIAGNOSTIC RIGHT WITH TOMO: ICD-10-PCS | Mod: 26,RT,, | Performed by: RADIOLOGY

## 2022-06-01 PROCEDURE — 76642 ULTRASOUND BREAST LIMITED: CPT | Mod: 26,RT,, | Performed by: RADIOLOGY

## 2022-06-01 PROCEDURE — 77061 BREAST TOMOSYNTHESIS UNI: CPT | Mod: 26,RT,, | Performed by: RADIOLOGY

## 2022-06-01 PROCEDURE — 77065 DX MAMMO INCL CAD UNI: CPT | Mod: 26,RT,, | Performed by: RADIOLOGY

## 2022-06-01 RX ORDER — DULOXETIN HYDROCHLORIDE 30 MG/1
30 CAPSULE, DELAYED RELEASE ORAL 2 TIMES DAILY
Qty: 60 CAPSULE | Refills: 11 | Status: SHIPPED | OUTPATIENT
Start: 2022-06-01 | End: 2023-04-24

## 2022-06-01 RX ORDER — MELOXICAM 15 MG/1
15 TABLET ORAL DAILY PRN
Qty: 30 TABLET | Refills: 3 | Status: SHIPPED | OUTPATIENT
Start: 2022-06-01 | End: 2023-04-24

## 2022-06-01 RX ORDER — ALPRAZOLAM 0.5 MG/1
0.5 TABLET ORAL NIGHTLY PRN
Qty: 30 TABLET | Refills: 0 | Status: SHIPPED | OUTPATIENT
Start: 2022-06-01 | End: 2022-06-02 | Stop reason: SDUPTHER

## 2022-06-02 NOTE — TELEPHONE ENCOUNTER
----- Message from Jennie Ascencio sent at 6/2/2022 10:56 AM CDT -----  Regarding: Requesting a call back  Contact: RAJ PARKINSON [[id number]]  Name of Who is Calling:RAJ PARKINSON [[id number]]           What is the request in detail:   Pt states she is requesting her mediation be sent to [id number] - [place], [place] - [address],   1)ALPRAZolam (XANAX) 0.5 MG tablet   2)DULoxetine (CYMBALTA) 30 MG capsule   3)meloxicam (MOBIC) 15 MG tablet   Please advise   Can the clinic reply by [facility]: No           [facility] to [facility]:[phone]

## 2022-06-03 ENCOUNTER — TELEPHONE (OUTPATIENT)
Dept: FAMILY MEDICINE | Facility: CLINIC | Age: 62
End: 2022-06-03
Payer: COMMERCIAL

## 2022-06-03 RX ORDER — ALPRAZOLAM 0.5 MG/1
0.5 TABLET ORAL NIGHTLY PRN
Qty: 30 TABLET | Refills: 0 | Status: SHIPPED | OUTPATIENT
Start: 2022-06-03 | End: 2023-04-24

## 2022-06-03 RX ORDER — MELOXICAM 15 MG/1
15 TABLET ORAL DAILY PRN
Qty: 30 TABLET | Refills: 3 | OUTPATIENT
Start: 2022-06-03

## 2022-06-03 RX ORDER — DULOXETIN HYDROCHLORIDE 30 MG/1
30 CAPSULE, DELAYED RELEASE ORAL 2 TIMES DAILY
Qty: 60 CAPSULE | Refills: 11 | OUTPATIENT
Start: 2022-06-03

## 2022-06-03 NOTE — TELEPHONE ENCOUNTER
Patients states the DULoxetine (CYMBALTA) is for her anxiety depression and that she cant miss it. Patient states she is running low and needs her medication. Patient is asking do you want her to take a different kind of medication? Patient states she only take ALPRAZolam (XANAX) at night to help her sleep sometimes.  Please advise

## 2022-06-03 NOTE — TELEPHONE ENCOUNTER
----- Message from Jackie Pryor sent at 6/3/2022  8:45 AM CDT -----  Regarding: call back in regards to medication  Type: Patient Call Back    Who called: Chelsie     What is the request in detail: the patient is returning a call back to the staff in regards to medication. Please return call at earliest convenience.    Can the clinic reply by [facility]?no     Would the patient rather a call back or a response via [facility]? Call back     Best call back number:[phone]

## 2022-06-03 NOTE — TELEPHONE ENCOUNTER
Spoke with patient and informed her that her message for refill on medications cymbalta, meloxicam, and xanax has been sent to the provider. We are waiting on a response. Patient verbalized understanding.

## 2022-06-08 RX ORDER — AZELASTINE 1 MG/ML
1 SPRAY, METERED NASAL 2 TIMES DAILY
Qty: 30 ML | Refills: 11 | Status: SHIPPED | OUTPATIENT
Start: 2022-06-08 | End: 2023-04-24

## 2022-08-29 ENCOUNTER — TELEPHONE (OUTPATIENT)
Dept: FAMILY MEDICINE | Facility: CLINIC | Age: 62
End: 2022-08-29
Payer: COMMERCIAL

## 2022-08-29 DIAGNOSIS — Z23 NEED FOR SHINGLES VACCINE: Primary | ICD-10-CM

## 2022-08-30 ENCOUNTER — CLINICAL SUPPORT (OUTPATIENT)
Dept: FAMILY MEDICINE | Facility: CLINIC | Age: 62
End: 2022-08-30
Payer: COMMERCIAL

## 2022-08-30 ENCOUNTER — TELEPHONE (OUTPATIENT)
Dept: FAMILY MEDICINE | Facility: CLINIC | Age: 62
End: 2022-08-30

## 2022-08-30 ENCOUNTER — LAB VISIT (OUTPATIENT)
Dept: LAB | Facility: HOSPITAL | Age: 62
End: 2022-08-30
Attending: STUDENT IN AN ORGANIZED HEALTH CARE EDUCATION/TRAINING PROGRAM
Payer: COMMERCIAL

## 2022-08-30 DIAGNOSIS — Z11.59 ENCOUNTER FOR HEPATITIS C SCREENING TEST FOR LOW RISK PATIENT: ICD-10-CM

## 2022-08-30 DIAGNOSIS — E78.1 HYPERTRIGLYCERIDEMIA, ESSENTIAL: ICD-10-CM

## 2022-08-30 DIAGNOSIS — Z23 NEED FOR SHINGLES VACCINE: Primary | ICD-10-CM

## 2022-08-30 LAB
CHOLEST SERPL-MCNC: 203 MG/DL (ref 120–199)
CHOLEST/HDLC SERPL: 3.4 {RATIO} (ref 2–5)
HCV AB SERPL QL IA: NORMAL
HDLC SERPL-MCNC: 59 MG/DL (ref 40–75)
HDLC SERPL: 29.1 % (ref 20–50)
LDLC SERPL CALC-MCNC: 123.2 MG/DL (ref 63–159)
NONHDLC SERPL-MCNC: 144 MG/DL
TRIGL SERPL-MCNC: 104 MG/DL (ref 30–150)

## 2022-08-30 PROCEDURE — 90750 HZV VACC RECOMBINANT IM: CPT | Mod: S$GLB,,, | Performed by: STUDENT IN AN ORGANIZED HEALTH CARE EDUCATION/TRAINING PROGRAM

## 2022-08-30 PROCEDURE — 86803 HEPATITIS C AB TEST: CPT | Performed by: STUDENT IN AN ORGANIZED HEALTH CARE EDUCATION/TRAINING PROGRAM

## 2022-08-30 PROCEDURE — 36415 COLL VENOUS BLD VENIPUNCTURE: CPT | Mod: PO | Performed by: STUDENT IN AN ORGANIZED HEALTH CARE EDUCATION/TRAINING PROGRAM

## 2022-08-30 PROCEDURE — 80061 LIPID PANEL: CPT | Performed by: STUDENT IN AN ORGANIZED HEALTH CARE EDUCATION/TRAINING PROGRAM

## 2022-08-30 PROCEDURE — 90750 ZOSTER RECOMBINANT VACCINE: ICD-10-PCS | Mod: S$GLB,,, | Performed by: STUDENT IN AN ORGANIZED HEALTH CARE EDUCATION/TRAINING PROGRAM

## 2022-08-30 NOTE — TELEPHONE ENCOUNTER
----- Message from Chloe Monroy MD sent at 8/30/2022  3:02 PM CDT -----  Please let pt know that her cholesterol is good and can repeated as needed.

## 2023-02-07 ENCOUNTER — PATIENT MESSAGE (OUTPATIENT)
Dept: ADMINISTRATIVE | Facility: HOSPITAL | Age: 63
End: 2023-02-07
Payer: COMMERCIAL

## 2023-04-24 ENCOUNTER — PATIENT OUTREACH (OUTPATIENT)
Dept: ADMINISTRATIVE | Facility: HOSPITAL | Age: 63
End: 2023-04-24
Payer: COMMERCIAL

## 2023-04-24 ENCOUNTER — OFFICE VISIT (OUTPATIENT)
Dept: FAMILY MEDICINE | Facility: CLINIC | Age: 63
End: 2023-04-24
Payer: COMMERCIAL

## 2023-04-24 VITALS
BODY MASS INDEX: 26.31 KG/M2 | WEIGHT: 154.13 LBS | HEIGHT: 64 IN | HEART RATE: 62 BPM | SYSTOLIC BLOOD PRESSURE: 118 MMHG | DIASTOLIC BLOOD PRESSURE: 74 MMHG | TEMPERATURE: 98 F | OXYGEN SATURATION: 98 %

## 2023-04-24 DIAGNOSIS — Z00.00 ANNUAL PHYSICAL EXAM: Primary | ICD-10-CM

## 2023-04-24 DIAGNOSIS — Z12.31 VISIT FOR SCREENING MAMMOGRAM: ICD-10-CM

## 2023-04-24 DIAGNOSIS — F33.0 MILD EPISODE OF RECURRENT MAJOR DEPRESSIVE DISORDER: ICD-10-CM

## 2023-04-24 PROCEDURE — 1159F MED LIST DOCD IN RCRD: CPT | Mod: CPTII,S$GLB,, | Performed by: FAMILY MEDICINE

## 2023-04-24 PROCEDURE — 3074F PR MOST RECENT SYSTOLIC BLOOD PRESSURE < 130 MM HG: ICD-10-PCS | Mod: CPTII,S$GLB,, | Performed by: FAMILY MEDICINE

## 2023-04-24 PROCEDURE — 3074F SYST BP LT 130 MM HG: CPT | Mod: CPTII,S$GLB,, | Performed by: FAMILY MEDICINE

## 2023-04-24 PROCEDURE — 99999 PR PBB SHADOW E&M-EST. PATIENT-LVL IV: ICD-10-PCS | Mod: PBBFAC,,, | Performed by: FAMILY MEDICINE

## 2023-04-24 PROCEDURE — 99999 PR PBB SHADOW E&M-EST. PATIENT-LVL IV: CPT | Mod: PBBFAC,,, | Performed by: FAMILY MEDICINE

## 2023-04-24 PROCEDURE — 99213 PR OFFICE/OUTPT VISIT, EST, LEVL III, 20-29 MIN: ICD-10-PCS | Mod: 25,S$GLB,, | Performed by: FAMILY MEDICINE

## 2023-04-24 PROCEDURE — 99396 PREV VISIT EST AGE 40-64: CPT | Mod: S$GLB,,, | Performed by: FAMILY MEDICINE

## 2023-04-24 PROCEDURE — 99396 PR PREVENTIVE VISIT,EST,40-64: ICD-10-PCS | Mod: S$GLB,,, | Performed by: FAMILY MEDICINE

## 2023-04-24 PROCEDURE — 3008F BODY MASS INDEX DOCD: CPT | Mod: CPTII,S$GLB,, | Performed by: FAMILY MEDICINE

## 2023-04-24 PROCEDURE — 1160F PR REVIEW ALL MEDS BY PRESCRIBER/CLIN PHARMACIST DOCUMENTED: ICD-10-PCS | Mod: CPTII,S$GLB,, | Performed by: FAMILY MEDICINE

## 2023-04-24 PROCEDURE — 99213 OFFICE O/P EST LOW 20 MIN: CPT | Mod: 25,S$GLB,, | Performed by: FAMILY MEDICINE

## 2023-04-24 PROCEDURE — 1159F PR MEDICATION LIST DOCUMENTED IN MEDICAL RECORD: ICD-10-PCS | Mod: CPTII,S$GLB,, | Performed by: FAMILY MEDICINE

## 2023-04-24 PROCEDURE — 3078F DIAST BP <80 MM HG: CPT | Mod: CPTII,S$GLB,, | Performed by: FAMILY MEDICINE

## 2023-04-24 PROCEDURE — 1160F RVW MEDS BY RX/DR IN RCRD: CPT | Mod: CPTII,S$GLB,, | Performed by: FAMILY MEDICINE

## 2023-04-24 PROCEDURE — 3078F PR MOST RECENT DIASTOLIC BLOOD PRESSURE < 80 MM HG: ICD-10-PCS | Mod: CPTII,S$GLB,, | Performed by: FAMILY MEDICINE

## 2023-04-24 PROCEDURE — 3008F PR BODY MASS INDEX (BMI) DOCUMENTED: ICD-10-PCS | Mod: CPTII,S$GLB,, | Performed by: FAMILY MEDICINE

## 2023-04-24 RX ORDER — BUSPIRONE HYDROCHLORIDE 5 MG/1
5 TABLET ORAL 2 TIMES DAILY
Qty: 60 TABLET | Refills: 11 | Status: SHIPPED | OUTPATIENT
Start: 2023-04-24 | End: 2024-04-23

## 2023-04-24 NOTE — PROGRESS NOTES
"Routine Office Visit    Chelsie Yodert  1960  [id number]      Subjective     Chelsie is a 62 y.o. female who presents today for:    Annual exam / establish care / new to me  Modified visit - topics discussed outside of annual physical    Weight - Patient is struggling with her weight. She will schedule weight loss appt.   Anxiety - Patient  has cancer. She is very stresed. She is unable to sleep at night. She did not like trazodone - made her too drowsy. She did not like xanax either. She feels she would benefit from something to help her turn off her brain. It's been difficult with her , change in insurance and change in doctor. She sometimes feels very anxious and is very stressed about her current situation.   Pap smear - Patient had pap smear done last year. Advised of new guidelines     Objective     Review of Systems   Constitutional:  Negative for chills and fever.   HENT:  Negative for congestion.    Eyes:  Negative for blurred vision.   Respiratory:  Negative for cough.    Cardiovascular:  Negative for chest pain.   Gastrointestinal:  Negative for abdominal pain, constipation, diarrhea, heartburn, nausea and vomiting.   Genitourinary:  Negative for dysuria.   Musculoskeletal:  Negative for myalgias.   Skin:  Negative for itching and rash.   Neurological:  Negative for dizziness and headaches.   Psychiatric/Behavioral:  Negative for depression.      /74   Pulse 62   Temp 98.1 °F (36.7 °C) (Oral)   Ht 5' 4" (1.626 m)   Wt 69.9 kg (154 lb 1.6 oz)   LMP 01/12/2012   SpO2 98%   BMI 26.45 kg/m²   Physical Exam  Constitutional:       Appearance: She is well-developed.   HENT:      Head: Normocephalic and atraumatic.   Eyes:      Conjunctiva/sclera: Conjunctivae normal.      Pupils: Pupils are equal, round, and reactive to light.   Cardiovascular:      Rate and Rhythm: Normal rate and regular rhythm.      Heart sounds: Normal heart sounds. No murmur heard.    No friction rub. No " gallop.   Pulmonary:      Effort: No respiratory distress.      Breath sounds: Normal breath sounds.   Abdominal:      General: Bowel sounds are normal. There is no distension.      Palpations: Abdomen is soft.      Tenderness: There is no abdominal tenderness.   Musculoskeletal:         General: Normal range of motion.      Cervical back: Normal range of motion and neck supple.   Lymphadenopathy:      Cervical: No cervical adenopathy.   Skin:     General: Skin is warm.   Neurological:      Mental Status: She is alert and oriented to person, place, and time.           Assessment     Health Maintenance         Date Due Completion Date    COVID-19 Vaccine (1) Never done ---    HIV Screening Never done ---    Hemoglobin A1c (Diabetic Prevention Screening) Never done ---    Influenza Vaccine (1) Never done ---    Colorectal Cancer Screening 03/02/2023 3/2/2022    Mammogram 06/01/2023 6/1/2022    Cervical Cancer Screening 03/03/2025 12/17/2019    Lipid Panel 08/30/2027 8/30/2022    TETANUS VACCINE 05/30/2032 5/30/2022              Problem List Items Addressed This Visit    Modified visit - topics discussed outside of annual physical    ROS: anxiety   PE: as above       Psychiatric    Mild episode of recurrent major depressive disorder    Relevant Medications    busPIRone (BUSPAR) 5 MG Tab  Common side effects of this medication were discussed with the patient. Questions regarding medications were discussed during this visit.    Recommend buspar for anxiety.   Avoid benzodiazepine      Other Visit Diagnoses       Annual physical exam    -  Primary    Relevant Orders    CBC Auto Differential    Comprehensive Metabolic Panel    Lipid Panel    Hemoglobin A1C    TSH    HIV 1/2 Ag/Ab (4th Gen)  I addressed all major concerns as it related to health maintenance.  All were ordered and scheduled based on the patients wishes.  Any additional health maintenance will be readdressed at the next physical if declined or deferred by the  patient.       Visit for screening mammogram        Relevant Orders    Mammo Digital Screening Bilat w/ [facility]                  Follow up in about 1 year (around 4/24/2024), or if symptoms worsen or fail to improve, for yearly exam.

## 2023-04-26 ENCOUNTER — LAB VISIT (OUTPATIENT)
Dept: LAB | Facility: HOSPITAL | Age: 63
End: 2023-04-26
Attending: FAMILY MEDICINE
Payer: COMMERCIAL

## 2023-04-26 DIAGNOSIS — Z00.00 ANNUAL PHYSICAL EXAM: ICD-10-CM

## 2023-04-26 LAB
ALBUMIN SERPL BCP-MCNC: 4.1 G/DL (ref 3.5–5.2)
ALP SERPL-CCNC: 105 U/L (ref 55–135)
ALT SERPL W/O P-5'-P-CCNC: 31 U/L (ref 10–44)
ANION GAP SERPL CALC-SCNC: 10 MMOL/L (ref 8–16)
AST SERPL-CCNC: 24 U/L (ref 10–40)
BASOPHILS # BLD AUTO: 0.06 K/UL (ref 0–0.2)
BASOPHILS NFR BLD: 1.1 % (ref 0–1.9)
BILIRUB SERPL-MCNC: 0.3 MG/DL (ref 0.1–1)
BUN SERPL-MCNC: 17 MG/DL (ref 8–23)
CALCIUM SERPL-MCNC: 9.8 MG/DL (ref 8.7–10.5)
CHLORIDE SERPL-SCNC: 106 MMOL/L (ref 95–110)
CHOLEST SERPL-MCNC: 214 MG/DL (ref 120–199)
CHOLEST/HDLC SERPL: 3.9 {RATIO} (ref 2–5)
CO2 SERPL-SCNC: 26 MMOL/L (ref 23–29)
CREAT SERPL-MCNC: 0.8 MG/DL (ref 0.5–1.4)
DIFFERENTIAL METHOD: NORMAL
EOSINOPHIL # BLD AUTO: 0.2 K/UL (ref 0–0.5)
EOSINOPHIL NFR BLD: 4.1 % (ref 0–8)
ERYTHROCYTE [DISTWIDTH] IN BLOOD BY AUTOMATED COUNT: 12.2 % (ref 11.5–14.5)
EST. GFR  (NO RACE VARIABLE): >60 ML/MIN/1.73 M^2
ESTIMATED AVG GLUCOSE: 111 MG/DL (ref 68–131)
GLUCOSE SERPL-MCNC: 82 MG/DL (ref 70–110)
HBA1C MFR BLD: 5.5 % (ref 4–5.6)
HCT VFR BLD AUTO: 42 % (ref 37–48.5)
HDLC SERPL-MCNC: 55 MG/DL (ref 40–75)
HDLC SERPL: 25.7 % (ref 20–50)
HGB BLD-MCNC: 14 G/DL (ref 12–16)
HIV 1+2 AB+HIV1 P24 AG SERPL QL IA: NORMAL
IMM GRANULOCYTES # BLD AUTO: 0.01 K/UL (ref 0–0.04)
IMM GRANULOCYTES NFR BLD AUTO: 0.2 % (ref 0–0.5)
LDLC SERPL CALC-MCNC: 139.2 MG/DL (ref 63–159)
LYMPHOCYTES # BLD AUTO: 2.4 K/UL (ref 1–4.8)
LYMPHOCYTES NFR BLD: 45.4 % (ref 18–48)
MCH RBC QN AUTO: 30.8 PG (ref 27–31)
MCHC RBC AUTO-ENTMCNC: 33.3 G/DL (ref 32–36)
MCV RBC AUTO: 93 FL (ref 82–98)
MONOCYTES # BLD AUTO: 0.6 K/UL (ref 0.3–1)
MONOCYTES NFR BLD: 10.4 % (ref 4–15)
NEUTROPHILS # BLD AUTO: 2.1 K/UL (ref 1.8–7.7)
NEUTROPHILS NFR BLD: 38.8 % (ref 38–73)
NONHDLC SERPL-MCNC: 159 MG/DL
NRBC BLD-RTO: 0 /100 WBC
PLATELET # BLD AUTO: 291 K/UL (ref 150–450)
PMV BLD AUTO: 10.5 FL (ref 9.2–12.9)
POTASSIUM SERPL-SCNC: 4.2 MMOL/L (ref 3.5–5.1)
PROT SERPL-MCNC: 6.8 G/DL (ref 6–8.4)
RBC # BLD AUTO: 4.54 M/UL (ref 4–5.4)
SODIUM SERPL-SCNC: 142 MMOL/L (ref 136–145)
TRIGL SERPL-MCNC: 99 MG/DL (ref 30–150)
TSH SERPL DL<=0.005 MIU/L-ACNC: 1.07 UIU/ML (ref 0.4–4)
WBC # BLD AUTO: 5.38 K/UL (ref 3.9–12.7)

## 2023-04-26 PROCEDURE — 80053 COMPREHEN METABOLIC PANEL: CPT | Performed by: FAMILY MEDICINE

## 2023-04-26 PROCEDURE — 36415 COLL VENOUS BLD VENIPUNCTURE: CPT | Mod: PO | Performed by: FAMILY MEDICINE

## 2023-04-26 PROCEDURE — 83036 HEMOGLOBIN GLYCOSYLATED A1C: CPT | Performed by: FAMILY MEDICINE

## 2023-04-26 PROCEDURE — 87389 HIV-1 AG W/HIV-1&-2 AB AG IA: CPT | Performed by: FAMILY MEDICINE

## 2023-04-26 PROCEDURE — 85025 COMPLETE CBC W/AUTO DIFF WBC: CPT | Performed by: FAMILY MEDICINE

## 2023-04-26 PROCEDURE — 84443 ASSAY THYROID STIM HORMONE: CPT | Performed by: FAMILY MEDICINE

## 2023-04-26 PROCEDURE — 80061 LIPID PANEL: CPT | Performed by: FAMILY MEDICINE

## 2023-04-28 ENCOUNTER — PATIENT OUTREACH (OUTPATIENT)
Dept: ADMINISTRATIVE | Facility: HOSPITAL | Age: 63
End: 2023-04-28
Payer: COMMERCIAL

## 2023-07-05 ENCOUNTER — OFFICE VISIT (OUTPATIENT)
Dept: FAMILY MEDICINE | Facility: CLINIC | Age: 63
End: 2023-07-05

## 2023-07-05 VITALS
WEIGHT: 142.19 LBS | OXYGEN SATURATION: 97 % | DIASTOLIC BLOOD PRESSURE: 70 MMHG | TEMPERATURE: 98 F | SYSTOLIC BLOOD PRESSURE: 136 MMHG | HEART RATE: 70 BPM | BODY MASS INDEX: 24.41 KG/M2

## 2023-07-05 DIAGNOSIS — F33.0 MILD EPISODE OF RECURRENT MAJOR DEPRESSIVE DISORDER: ICD-10-CM

## 2023-07-05 DIAGNOSIS — F41.8 DEPRESSION WITH ANXIETY: Primary | ICD-10-CM

## 2023-07-05 PROCEDURE — 99213 PR OFFICE/OUTPT VISIT, EST, LEVL III, 20-29 MIN: ICD-10-PCS | Mod: S$PBB,,, | Performed by: NURSE PRACTITIONER

## 2023-07-05 PROCEDURE — 99999 PR PBB SHADOW E&M-EST. PATIENT-LVL IV: CPT | Mod: PBBFAC,,, | Performed by: NURSE PRACTITIONER

## 2023-07-05 PROCEDURE — 99213 OFFICE O/P EST LOW 20 MIN: CPT | Mod: S$PBB,,, | Performed by: NURSE PRACTITIONER

## 2023-07-05 PROCEDURE — 99999 PR PBB SHADOW E&M-EST. PATIENT-LVL IV: ICD-10-PCS | Mod: PBBFAC,,, | Performed by: NURSE PRACTITIONER

## 2023-07-05 PROCEDURE — 99214 OFFICE O/P EST MOD 30 MIN: CPT | Mod: PBBFAC,PO | Performed by: NURSE PRACTITIONER

## 2023-07-05 RX ORDER — DULOXETIN HYDROCHLORIDE 60 MG/1
60 CAPSULE, DELAYED RELEASE ORAL DAILY
Qty: 30 CAPSULE | Refills: 11 | Status: SHIPPED | OUTPATIENT
Start: 2023-07-05 | End: 2023-10-24 | Stop reason: SDUPTHER

## 2023-07-05 RX ORDER — DULOXETIN HYDROCHLORIDE 30 MG/1
30 CAPSULE, DELAYED RELEASE ORAL DAILY
Qty: 14 CAPSULE | Refills: 0 | Status: SHIPPED | OUTPATIENT
Start: 2023-07-05 | End: 2023-07-19

## 2023-07-05 RX ORDER — DULOXETIN HYDROCHLORIDE 60 MG/1
60 CAPSULE, DELAYED RELEASE ORAL DAILY
Qty: 30 CAPSULE | Refills: 3 | Status: SHIPPED | OUTPATIENT
Start: 2023-07-05 | End: 2023-07-05

## 2023-07-05 NOTE — PATIENT INSTRUCTIONS
Medical Fitness--[phone]  Imaging, Xray, CT, MRI, Ultrasound---[phone]  Bariatrics---[phone]  Breast Surgery---[phone]  Case Management---[phone]  Colonoscopy---[phone]  DME---[phone]  Infectious Disease---[phone]  Interventional Radiology---[phone]  Medical Records---[phone]  [facility]---[phone]  Optometry/Ophthalmology---[phone]  [facility] Bar---[phone]  Physical Therapy---[phone]  Psychiatry---[phone] or [phone]  Plastic Surgery---[phone]  Recovery--[phone] option 2, or [phone].  Sleep Study---[phone]  Smoking Cessation---[phone]  Wound Care---[phone]  Referral Desk---[phone]

## 2023-07-05 NOTE — PROGRESS NOTES
HPI     Chief Complaint:  anxiety      Chelsie Felton is a 62 y.o. female with multiple medical diagnoses as listed in the medical history and problem list that presents for anxiety.  Pt is new to me but is known to this clinic with her last appointment being 4/24/2023.      Anxiety  Presents for initial visit. Onset was 1 to 6 months ago. The problem has been gradually worsening. Symptoms include depressed mood, excessive worry, nervous/anxious behavior and restlessness. Patient reports no chest pain, feeling of choking, shortness of breath or suicidal ideas. Symptoms occur most days. The severity of symptoms is severe. The symptoms are aggravated by family issues. The patient sleeps 5 hours per night. The quality of sleep is poor.     Risk factors include a major life event (her  has cancer). Her past medical history is significant for anxiety/panic attacks. Treatments tried: buspirone and trazodone. The treatment provided mild relief. Compliance with prior treatments has been good.     She is currently prescribed buspirone 5 mg bid but is not compliant with medication.     See below notes from 4/24/23 encounter:    Anxiety - Patient  has cancer. She is very stresed. She is unable to sleep at night. She did not like trazodone - made her too drowsy. She did not like xanax either. She feels she would benefit from something to help her turn off her brain. It's been difficult with her , change in insurance and change in doctor. She sometimes feels very anxious and is very stressed about her current situation.       Mild episode of recurrent major depressive disorder     Relevant Medications     busPIRone (BUSPAR) 5 MG Tab  Common side effects of this medication were discussed with the patient. Questions regarding medications were discussed during this visit.    Recommend buspar for anxiety.   Avoid benzodiazepine        Assessment & Plan     Problem List Items Addressed This Visit           Psychiatric    Mild episode of recurrent major depressive disorder    As below.     Other Visit Diagnoses       Depression with anxiety    -  Primary    She has taken duloxetine in the past with good effect.     's illness and his job loss have exacerbated her symptoms.     Pt is not taking buspirone.     GAD7 score = 21  PHQ9 score = 22    Encouraged the patient to perform self-calming techniques, such as deep breathing/relaxation techniques and exercise.    Denies thoughts of self harm.    Start duloxetine 30 mg x 2 wks then increase to 60 mg qd.    Discussed DDx, condition, and treatment.   Education sent to patient portal/included in after visit summary.  ED precautions given.   Notify provider if symptoms do not resolve or increase in severity.   Patient verbalizes understanding and agrees with plan of care.    Relevant Medications    DULoxetine (CYMBALTA) 30 MG capsule    DULoxetine (CYMBALTA) 60 MG capsule    Other Relevant Orders    Ambulatory referral/consult to [facility] (Non-Opioids)                --------------------------------------------      Health Maintenance:  Health Maintenance         Date Due Completion Date    COVID-19 Vaccine (1) Never done ---    Mammogram 07/18/2023 (Originally 6/1/2023) 6/1/2022    Influenza Vaccine (1) 09/01/2023 ---    Cervical Cancer Screening 03/03/2025 3/2/2022    Colorectal Cancer Screening 03/24/2027 3/24/2022    Lipid Panel 04/26/2028 4/26/2023    TETANUS VACCINE 05/30/2032 5/30/2022            Advised patient on the importance of completing overdue health maintenance items    Follow Up:  Follow up in about 2 weeks (around 7/19/2023), or if symptoms worsen or fail to improve.    Exam     Review of Systems:  (as noted above)  Review of Systems   Constitutional:  Negative for fever.   HENT:  Negative for trouble swallowing.    Eyes:  Negative for visual disturbance.   Respiratory:  Negative for chest tightness and shortness of breath.     Cardiovascular:  Negative for chest pain.   Gastrointestinal:  Negative for blood in stool.   Psychiatric/Behavioral:  Positive for dysphoric mood and sleep disturbance. Negative for self-injury and suicidal ideas. The patient is nervous/anxious.      Physical Exam:   Physical Exam  Constitutional:       General: She is not in acute distress.     Appearance: She is not ill-appearing or diaphoretic.   HENT:      Head: Normocephalic and atraumatic.   Cardiovascular:      Rate and Rhythm: Normal rate and regular rhythm.      Heart sounds: No murmur heard.    No friction rub. No gallop.   Pulmonary:      Effort: No respiratory distress.   Chest:      Chest wall: No tenderness.   Musculoskeletal:      Cervical back: No rigidity.   Skin:     Capillary Refill: Capillary refill takes 2 to 3 seconds.   Neurological:      General: No focal deficit present.      Mental Status: She is alert and oriented to person, place, and time.   Psychiatric:         Mood and Affect: Mood is anxious and depressed.         Thought Content: Thought content does not include suicidal ideation. Thought content does not include suicidal plan.     Vitals:    07/05/23 1701 07/05/23 1730   BP: (!) 140/74 136/70   BP Location: Right arm    Patient Position: Sitting    BP Method: Medium (Manual)    Pulse: 70    Temp: 97.8 °F (36.6 °C)    TempSrc: Oral    SpO2: 97%    Weight: 64.5 kg (142 lb 3.2 oz)       Body mass index is 24.41 kg/m².              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    Fibromyalgia     IBS (irritable bowel syndrome)     Plantar fasciitis     bilat       Past Surgical History:  Past Surgical History:   Procedure Laterality Date    BONE RESECTION, RIB      r/t rib fracture    BREAST BIOPSY Right 2015    sMALL FOCI OF FAT NECROSIS    NOSE SURGERY      TONSILLECTOMY      tummy tuck         Social History:  Social History     Socioeconomic History     Marital status:    Tobacco Use    Smoking status: Never    Smokeless tobacco: Never   Substance and Sexual Activity    Alcohol use: No    Drug use: Never    Sexual activity: Yes       Family History:  Family History   Problem Relation Age of Onset    Breast cancer Neg Hx     Colon cancer Neg Hx     Ovarian cancer Neg Hx        Allergies and Medications: (updated and reviewed)  Review of patient's allergies indicates:  No Known Allergies  Current Outpatient Medications   Medication Sig Dispense Refill    busPIRone (BUSPAR) 5 MG Tab Take 1 tablet (5 mg total) by mouth 2 (two) times daily. (Patient not taking: Reported on 7/5/2023) 60 tablet 11    DULoxetine (CYMBALTA) 30 MG capsule Take 1 capsule (30 mg total) by mouth once daily. for 14 days 14 capsule 0    DULoxetine (CYMBALTA) 60 MG capsule Take 1 capsule (60 mg total) by mouth once daily. 30 capsule 11     No current facility-administered medications for this visit.       Patient Care Team:  Nadiya Schultz MD as PCP - General (Family Medicine)  [facility] (Gastroenterology)  Kenyatta Carlin MA as Care Coordinator         - The patient is given an After Visit Summary that lists all medications with directions, allergies, education, orders placed during this encounter and follow-up instructions.      - I have reviewed the patient's medical information including past medical, family, and social history sections including the medications and allergies.      - We discussed the patient's current medications.     This note was created by combination of typed  and MModal dictation.  Transcription errors may be present.  If there are any questions, please contact me.       Barrington Alaniz NP

## 2023-07-06 ENCOUNTER — PATIENT MESSAGE (OUTPATIENT)
Dept: BEHAVIORAL HEALTH | Facility: CLINIC | Age: 63
End: 2023-07-06

## 2023-07-07 ENCOUNTER — TELEPHONE (OUTPATIENT)
Dept: BEHAVIORAL HEALTH | Facility: CLINIC | Age: 63
End: 2023-07-07

## 2023-07-07 ENCOUNTER — PATIENT MESSAGE (OUTPATIENT)
Dept: BEHAVIORAL HEALTH | Facility: CLINIC | Age: 63
End: 2023-07-07

## 2023-07-07 NOTE — PROGRESS NOTES
Patient was referred to the [facility] Non-Opioid program by PCP. CHW reached out to patient who stated [he/she] was not interested in enrolling in the [facility] Non-Opioid program.  CHW informed patient to notify [his/her] PCP of [his/her] wishes to engage in the [facility] Non-Opioid  program in the future.  CHW sent follow-up message to PCP via [facility].   Patient said she has no insurance. Staff gave patient counseling resources and financial assistance information.

## 2023-10-24 DIAGNOSIS — F41.8 DEPRESSION WITH ANXIETY: ICD-10-CM

## 2023-10-24 RX ORDER — DULOXETIN HYDROCHLORIDE 60 MG/1
60 CAPSULE, DELAYED RELEASE ORAL DAILY
Qty: 30 CAPSULE | Refills: 11 | Status: SHIPPED | OUTPATIENT
Start: 2023-10-24

## 2024-04-10 ENCOUNTER — PATIENT MESSAGE (OUTPATIENT)
Dept: ADMINISTRATIVE | Facility: HOSPITAL | Age: 64
End: 2024-04-10

## 2024-04-10 ENCOUNTER — PATIENT OUTREACH (OUTPATIENT)
Dept: ADMINISTRATIVE | Facility: HOSPITAL | Age: 64
End: 2024-04-10

## 2024-10-28 ENCOUNTER — TELEPHONE (OUTPATIENT)
Dept: FAMILY MEDICINE | Facility: CLINIC | Age: 64
End: 2024-10-28

## 2024-10-28 DIAGNOSIS — F41.8 DEPRESSION WITH ANXIETY: ICD-10-CM

## 2024-10-28 RX ORDER — DULOXETIN HYDROCHLORIDE 60 MG/1
60 CAPSULE, DELAYED RELEASE ORAL DAILY
Qty: 30 CAPSULE | Refills: 0 | Status: SHIPPED | OUTPATIENT
Start: 2024-10-28

## 2024-11-14 ENCOUNTER — OFFICE VISIT (OUTPATIENT)
Dept: FAMILY MEDICINE | Facility: CLINIC | Age: 64
End: 2024-11-14

## 2024-11-14 VITALS
HEART RATE: 81 BPM | DIASTOLIC BLOOD PRESSURE: 72 MMHG | WEIGHT: 168.88 LBS | BODY MASS INDEX: 28.83 KG/M2 | TEMPERATURE: 98 F | SYSTOLIC BLOOD PRESSURE: 124 MMHG | HEIGHT: 64 IN | OXYGEN SATURATION: 95 %

## 2024-11-14 DIAGNOSIS — Z00.00 VISIT FOR ANNUAL HEALTH EXAMINATION: Primary | ICD-10-CM

## 2024-11-14 DIAGNOSIS — Z12.31 ENCOUNTER FOR SCREENING MAMMOGRAM FOR BREAST CANCER: ICD-10-CM

## 2024-11-14 DIAGNOSIS — R23.2 HOT FLASHES: ICD-10-CM

## 2024-11-14 DIAGNOSIS — F41.8 DEPRESSION WITH ANXIETY: ICD-10-CM

## 2024-11-14 DIAGNOSIS — E66.3 OVERWEIGHT WITH BODY MASS INDEX (BMI) OF 28 TO 28.9 IN ADULT: ICD-10-CM

## 2024-11-14 PROCEDURE — 99999 PR PBB SHADOW E&M-EST. PATIENT-LVL IV: CPT | Mod: PBBFAC,,,

## 2024-11-14 PROCEDURE — 99214 OFFICE O/P EST MOD 30 MIN: CPT | Mod: PBBFAC,PO

## 2024-11-14 RX ORDER — HYDROXYZINE HYDROCHLORIDE 25 MG/1
25 TABLET, FILM COATED ORAL 3 TIMES DAILY PRN
Qty: 30 TABLET | Refills: 1 | Status: SHIPPED | OUTPATIENT
Start: 2024-11-14

## 2024-11-14 RX ORDER — DULOXETIN HYDROCHLORIDE 60 MG/1
60 CAPSULE, DELAYED RELEASE ORAL DAILY
Qty: 30 CAPSULE | Refills: 11 | Status: SHIPPED | OUTPATIENT
Start: 2024-11-14

## 2024-11-14 NOTE — PATIENT INSTRUCTIONS
Please work on diet/exercise  1) diet:  low carbohydrate, low fat diet, stay away from fast food, fried and processed food, use whole grain, lot of fruits and vegetables, use healthy fat such as avocado, nuts and olive oil in reasonable quantity, stay away from sodas. Regular meals with lean proteins.  2) physical activity: ideally 150 min a week, with cardiovascular and resistance activity.      I recommend you complete your annual labs. Call billing/medical records to help determine cost. We could consider sending to [employer] if cheaper.       Medical Records---[phone]

## 2024-11-14 NOTE — PROGRESS NOTES
HPI     Chief Complaint:  Chief Complaint   Patient presents with    Annual Exam       Chelsie Felton is a 64 y.o. female with multiple medical diagnoses as listed in the medical history and problem list that presents for   Chief Complaint   Patient presents with    Annual Exam   .   Patient is not known to me with her last appointment in this department on Visit date not found.      HPI    Annual - seen by NP student. I attest that I have reviewed the student note and that the components of the history of the present illness, the physical exam, and the assessment and plan documented were performed by me or were performed in my presence by the student where I verified the documentation and performed (or re-performed) the exam and medical decision making.      Social Factors  Tobacco use: No  Ready to Quit: No  Alcohol: No      Depression  Over the past two weeks, have you felt down, depressed, or hopeless? No but does feel anxious on occasion and would like medication. Didn't like buspar, unclear if she ever took. Cymbalta helps with depression, worried about weight gain. Expresses interested in valium for as needed basis. Explained to pt, I don't recommend benzodiazepines given age close to 65. Will need to complete fasting labs first to r/o underlying medical issues (check thyroid). Pt does not currently have insurance so worried about cost. Agrees to hydroxyzine prn. Could consider BB  Over the past two weeks, have you felt little interest or pleasure in doing things? No    Reproductive Health  Last PAP: due in 2025  HIV screening: negative    CHD, HTN, DM2  CHD Risk Factors: none  Women 45 years and older should be screened for dyslipidemia if at increased risk of CHD  Women 20 to 45 years of age should be screened for dyslipidemia if at increased risk of CHD  Asymptomatic adults with sustained blood pressure greater than 135/80 mm Hg (treated or untreated) should be screened for type 2 diabetes  "mellitus    Estimated body mass index is 28.99 kg/m² as calculated from the following:    Height as of this encounter: 5' 4" (1.626 m).    Weight as of this encounter: 76.6 kg (168 lb 14 oz).    Screening  Mammogram needed: order placed order placed, overdue  Colonoscopy needed: reviewed due 2027  Osteoporosis screen needed: age     Women 50 to 74 years of age should be screened for breast cancer with mammography biennially.  Women should be screened for cervical cancer with Pap tests beginning at 21 years of age. Low-risk women should receive Pap testing every three years. Co-testing for human papillomavirus is an option beginning at 30 years of age, and can extend the screening interval to five years. Cervical cancer screening should be discontinued at 65 years of age or after total hysterectomy if the woman has a benign gynecologic history  Adults 50 to 75 years of age should be screened for colorectal cancer with an FOBT annually, sigmoidoscopy every five years with an FOBT every three years, or colonoscopy every 10 years.  Women 65 years and older should be screened for osteoporosis. Women younger than 65 years should be screened if the risk of fracture is greater than or equal to that of a 65-year-old white woman without additional risk factors.    Assessment & Plan       Problem List Items Addressed This Visit    None  Visit Diagnoses       Visit for annual health examination    -  Primary  Counseled on age appropriate medical preventative services including age appropriate cancer screenings, age appropriate eye and dental exams, over all nutritional health, need for a consistent exercise regimen, and an over all push towards maintaining a vigorous and active lifestyle.  Counseled on age appropriate vaccines and discussed upcoming health care needs based on age/gender. Discussed good sleep hygiene and stress management.    Provided number to billing department, will check on price before completing labs.     " Relevant Orders    CBC Auto Differential    Comprehensive Metabolic Panel    Lipid Panel    Hemoglobin A1C    TSH    T4, FREE    Depression with anxiety    Labs, check TSH  Continue cymbalta  Trial hydroxyzine for anxiety, consider BB or alternative  Didn't like buspar?      Relevant Medications    hydrOXYzine HCL (ATARAX) 25 MG tablet    DULoxetine (CYMBALTA) 60 MG capsule    Overweight with body mass index (BMI) of 28 to 28.9 in adult    Continue to work on diet/exercise.       Hot flashes      Check labs    Encounter for screening mammogram for breast cancer      Declines at this time because of no insurance    Relevant Orders    Mammo Digital Screening Bilat w/ [facility]            --------------------------------------------      Health Maintenance:  Health Maintenance         Date Due Completion Date    RSV Vaccine (Age 60+ and Pregnant patients) (1 - Risk 60-74 years 1-dose series) Never done ---    Mammogram 06/01/2023 6/1/2022    COVID-19 Vaccine (3 - 2024-25 season) 09/01/2024 1/8/2022    Cervical Cancer Screening 03/02/2025 3/2/2022    Hemoglobin A1c (Diabetic Prevention Screening) 04/26/2026 4/26/2023    Colorectal Cancer Screening 03/24/2027 3/24/2022    Lipid Panel 04/26/2028 4/26/2023    TETANUS VACCINE 05/30/2032 5/30/2022            Mammogram ordered, Lipid panel ordered, and A1c ordered    Follow Up:  No follow-ups on file.    Discussed DDx, condition, and treatment.   Education sent to patient portal/included in after visit summary.  ED precautions given.   Notify provider if symptoms do not resolve or increase in severity.   Patient verbalizes understanding and agrees with plan of care.      Exam     Review of Systems:  (as noted above)  Review of Systems    Physical Exam:   Physical Exam  Constitutional:       General: She is not in acute distress.     Appearance: Normal appearance. She is not toxic-appearing.   Cardiovascular:      Rate and Rhythm: Normal rate and regular rhythm.      Pulses: Normal  "pulses.      Heart sounds: Normal heart sounds.   Pulmonary:      Effort: Pulmonary effort is normal. No respiratory distress.      Breath sounds: Normal breath sounds.   Musculoskeletal:      Cervical back: Normal range of motion and neck supple. No rigidity.   Lymphadenopathy:      Cervical: No cervical adenopathy.   Skin:     Capillary Refill: Capillary refill takes less than 2 seconds.   Neurological:      General: No focal deficit present.      Mental Status: She is alert and oriented to person, place, and time.   Psychiatric:         Mood and Affect: Mood normal.       Vitals:    11/14/24 1406   BP: 124/72   Pulse: 81   Temp: 98.2 °F (36.8 °C)   TempSrc: Oral   SpO2: 95%   Weight: 76.6 kg (168 lb 14 oz)   Height: 5' 4" (1.626 m)      Body mass index is 28.99 kg/m².        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    Fibromyalgia     IBS (irritable bowel syndrome)     Plantar fasciitis     bilat       Past Surgical History:  Past Surgical History:   Procedure Laterality Date    BONE RESECTION, RIB      r/t rib fracture    BREAST BIOPSY Right 2015    sMALL FOCI OF FAT NECROSIS    NOSE SURGERY      TONSILLECTOMY      tummy tuck         Social History:  Social History     Socioeconomic History    Marital status:    Tobacco Use    Smoking status: Never    Smokeless tobacco: Never   Substance and Sexual Activity    Alcohol use: No    Drug use: Never    Sexual activity: Yes       Family History:  Family History   Problem Relation Name Age of Onset    Breast cancer Neg Hx      Colon cancer Neg Hx      Ovarian cancer Neg Hx         Allergies and Medications: (updated and reviewed)  Review of patient's allergies indicates:  No Known Allergies  Current Outpatient Medications   Medication Sig Dispense Refill    DULoxetine (CYMBALTA) 60 MG capsule Take 1 capsule (60 mg total) by mouth once daily. 30 capsule 11    hydrOXYzine HCL " (ATARAX) 25 MG tablet Take 1 tablet (25 mg total) by mouth 3 (three) times daily as needed for Anxiety. 30 tablet 1     No current facility-administered medications for this visit.       Patient Care Team:  Nadiya Schultz MD as PCP - General (Family Medicine)  [facility] [place] [facility] (Gastroenterology)         - The patient is given an After Visit Summary that lists all medications with directions, allergies, education, orders placed during this encounter and follow-up instructions.      - I have reviewed the patient's medical information including past medical, family, and social history sections including the medications and allergies.      - We discussed the patient's current medications.     This note was created by combination of typed  and MModal dictation.  Transcription errors may be present.  If there are any questions, please contact me.

## 2024-11-25 ENCOUNTER — LAB VISIT (OUTPATIENT)
Dept: LAB | Facility: HOSPITAL | Age: 64
End: 2024-11-25
Attending: FAMILY MEDICINE

## 2024-11-25 ENCOUNTER — PATIENT OUTREACH (OUTPATIENT)
Dept: ADMINISTRATIVE | Facility: HOSPITAL | Age: 64
End: 2024-11-25

## 2024-11-25 DIAGNOSIS — Z00.00 VISIT FOR ANNUAL HEALTH EXAMINATION: ICD-10-CM

## 2024-11-25 LAB
ALBUMIN SERPL BCP-MCNC: 4.3 G/DL (ref 3.5–5.2)
ALP SERPL-CCNC: 95 U/L (ref 40–150)
ALT SERPL W/O P-5'-P-CCNC: 43 U/L (ref 10–44)
ANION GAP SERPL CALC-SCNC: 11 MMOL/L (ref 8–16)
AST SERPL-CCNC: 28 U/L (ref 10–40)
BASOPHILS # BLD AUTO: 0.05 K/UL (ref 0–0.2)
BASOPHILS NFR BLD: 0.9 % (ref 0–1.9)
BILIRUB SERPL-MCNC: 0.4 MG/DL (ref 0.1–1)
BUN SERPL-MCNC: 13 MG/DL (ref 8–23)
CALCIUM SERPL-MCNC: 9.7 MG/DL (ref 8.7–10.5)
CHLORIDE SERPL-SCNC: 108 MMOL/L (ref 95–110)
CHOLEST SERPL-MCNC: 214 MG/DL (ref 120–199)
CHOLEST/HDLC SERPL: 3.5 {RATIO} (ref 2–5)
CO2 SERPL-SCNC: 23 MMOL/L (ref 23–29)
CREAT SERPL-MCNC: 0.8 MG/DL (ref 0.5–1.4)
DIFFERENTIAL METHOD BLD: ABNORMAL
EOSINOPHIL # BLD AUTO: 0.2 K/UL (ref 0–0.5)
EOSINOPHIL NFR BLD: 4.2 % (ref 0–8)
ERYTHROCYTE [DISTWIDTH] IN BLOOD BY AUTOMATED COUNT: 13.1 % (ref 11.5–14.5)
EST. GFR  (NO RACE VARIABLE): >60 ML/MIN/1.73 M^2
ESTIMATED AVG GLUCOSE: 123 MG/DL (ref 68–131)
GLUCOSE SERPL-MCNC: 118 MG/DL (ref 70–110)
HBA1C MFR BLD: 5.9 % (ref 4–5.6)
HCT VFR BLD AUTO: 46.5 % (ref 37–48.5)
HDLC SERPL-MCNC: 62 MG/DL (ref 40–75)
HDLC SERPL: 29 % (ref 20–50)
HGB BLD-MCNC: 14.9 G/DL (ref 12–16)
IMM GRANULOCYTES # BLD AUTO: 0.02 K/UL (ref 0–0.04)
IMM GRANULOCYTES NFR BLD AUTO: 0.4 % (ref 0–0.5)
LDLC SERPL CALC-MCNC: 135.4 MG/DL (ref 63–159)
LYMPHOCYTES # BLD AUTO: 1.9 K/UL (ref 1–4.8)
LYMPHOCYTES NFR BLD: 35.7 % (ref 18–48)
MCH RBC QN AUTO: 31.1 PG (ref 27–31)
MCHC RBC AUTO-ENTMCNC: 32 G/DL (ref 32–36)
MCV RBC AUTO: 97 FL (ref 82–98)
MONOCYTES # BLD AUTO: 0.5 K/UL (ref 0.3–1)
MONOCYTES NFR BLD: 9.8 % (ref 4–15)
NEUTROPHILS # BLD AUTO: 2.6 K/UL (ref 1.8–7.7)
NEUTROPHILS NFR BLD: 49 % (ref 38–73)
NONHDLC SERPL-MCNC: 152 MG/DL
NRBC BLD-RTO: 0 /100 WBC
PLATELET # BLD AUTO: 285 K/UL (ref 150–450)
PMV BLD AUTO: 10.6 FL (ref 9.2–12.9)
POTASSIUM SERPL-SCNC: 4.8 MMOL/L (ref 3.5–5.1)
PROT SERPL-MCNC: 7.2 G/DL (ref 6–8.4)
RBC # BLD AUTO: 4.79 M/UL (ref 4–5.4)
SODIUM SERPL-SCNC: 142 MMOL/L (ref 136–145)
TRIGL SERPL-MCNC: 83 MG/DL (ref 30–150)
WBC # BLD AUTO: 5.29 K/UL (ref 3.9–12.7)

## 2024-11-25 PROCEDURE — 80061 LIPID PANEL: CPT

## 2024-11-25 PROCEDURE — 84439 ASSAY OF FREE THYROXINE: CPT

## 2024-11-25 PROCEDURE — 83036 HEMOGLOBIN GLYCOSYLATED A1C: CPT

## 2024-11-25 PROCEDURE — 36415 COLL VENOUS BLD VENIPUNCTURE: CPT | Mod: PO

## 2024-11-25 PROCEDURE — 85025 COMPLETE CBC W/AUTO DIFF WBC: CPT

## 2024-11-25 PROCEDURE — 84443 ASSAY THYROID STIM HORMONE: CPT

## 2024-11-25 PROCEDURE — 80053 COMPREHEN METABOLIC PANEL: CPT

## 2024-11-26 ENCOUNTER — TELEPHONE (OUTPATIENT)
Dept: FAMILY MEDICINE | Facility: CLINIC | Age: 64
End: 2024-11-26

## 2024-11-26 LAB
T4 FREE SERPL-MCNC: 0.8 NG/DL (ref 0.71–1.51)
TSH SERPL DL<=0.005 MIU/L-ACNC: 0.98 UIU/ML (ref 0.4–4)

## 2024-11-26 NOTE — TELEPHONE ENCOUNTER
Patient has been addressed in the patient portal by provider after this time call and patient has seen the message.

## 2024-11-26 NOTE — TELEPHONE ENCOUNTER
----- Message from Med Assistant Galilea sent at 11/26/2024 12:41 PM CST -----  Type:  Test Results    Who Called: Self    Name of Test (Lab/Mammo/Etc): Blood work    Date of Test: 11/25/2024    Ordering Provider: TIMOTHY Ortiz    Where the test was performed: [facility]     Would the patient rather a call back or a response via [facility]? Yes, call     Best Call Back Number:  [phone] (home)

## 2025-08-20 ENCOUNTER — OFFICE VISIT (OUTPATIENT)
Dept: OBSTETRICS AND GYNECOLOGY | Facility: CLINIC | Age: 65
End: 2025-08-20
Payer: MEDICARE

## 2025-08-20 VITALS
DIASTOLIC BLOOD PRESSURE: 80 MMHG | SYSTOLIC BLOOD PRESSURE: 118 MMHG | BODY MASS INDEX: 27.17 KG/M2 | WEIGHT: 158.31 LBS

## 2025-08-20 DIAGNOSIS — Z12.4 ENCOUNTER FOR PAPANICOLAOU SMEAR FOR CERVICAL CANCER SCREENING: ICD-10-CM

## 2025-08-20 DIAGNOSIS — Z13.820 OSTEOPOROSIS SCREENING: ICD-10-CM

## 2025-08-20 DIAGNOSIS — N95.8 OTHER SPECIFIED MENOPAUSAL AND PERIMENOPAUSAL DISORDERS: ICD-10-CM

## 2025-08-20 DIAGNOSIS — Z01.419 WELL WOMAN EXAM WITH ROUTINE GYNECOLOGICAL EXAM: Primary | ICD-10-CM

## 2025-08-20 PROCEDURE — 99999 PR PBB SHADOW E&M-EST. PATIENT-LVL II: CPT | Mod: PBBFAC,HCNC,, | Performed by: OBSTETRICS & GYNECOLOGY

## 2025-08-20 PROCEDURE — 87624 HPV HI-RISK TYP POOLED RSLT: CPT | Mod: HCNC | Performed by: OBSTETRICS & GYNECOLOGY
